# Patient Record
Sex: MALE | Race: WHITE | NOT HISPANIC OR LATINO | Employment: FULL TIME | URBAN - METROPOLITAN AREA
[De-identification: names, ages, dates, MRNs, and addresses within clinical notes are randomized per-mention and may not be internally consistent; named-entity substitution may affect disease eponyms.]

---

## 2019-01-02 ENCOUNTER — HOSPITAL ENCOUNTER (OUTPATIENT)
Dept: FAMILY MEDICINE CLINIC | Facility: CLINIC | Age: 19
Setting detail: SPECIMEN
Discharge: HOME | End: 2019-01-02
Attending: FAMILY MEDICINE | Admitting: FAMILY MEDICINE

## 2019-01-02 LAB
ALBUMIN SERPL-MCNC: 4.2 G/DL (ref 3.5–4.8)
ALBUMIN/GLOB SERPL: 2.1 {RATIO} (ref 1–1.7)
ALP SERPL-CCNC: 48 IU/L (ref 32–91)
ALT SERPL-CCNC: 23 IU/L (ref 17–63)
ANION GAP SERPL CALC-SCNC: 13.1 MMOL/L (ref 10–20)
AST SERPL-CCNC: 25 IU/L (ref 15–41)
BASOPHILS # BLD AUTO: 0 10*3/UL (ref 0–0.2)
BASOPHILS NFR BLD AUTO: 1 % (ref 0–2)
BILIRUB SERPL-MCNC: 1 MG/DL (ref 0.3–1.2)
BUN SERPL-MCNC: 8 MG/DL (ref 8–20)
BUN/CREAT SERPL: 10 (ref 6.2–20.3)
CALCIUM SERPL-MCNC: 9.8 MG/DL (ref 8.9–10.3)
CHLORIDE SERPL-SCNC: 102 MMOL/L (ref 101–111)
CONV CO2: 27 MMOL/L (ref 22–32)
CONV TOTAL PROTEIN: 6.2 G/DL (ref 6.1–7.9)
CREAT UR-MCNC: 0.8 MG/DL (ref 0.7–1.2)
DIFFERENTIAL METHOD BLD: (no result)
EOSINOPHIL # BLD AUTO: 0.3 10*3/UL (ref 0–0.3)
EOSINOPHIL # BLD AUTO: 4 % (ref 0–3)
ERYTHROCYTE [DISTWIDTH] IN BLOOD BY AUTOMATED COUNT: 13.4 % (ref 11.5–14.5)
GLOBULIN UR ELPH-MCNC: 2 G/DL (ref 2.5–3.8)
GLUCOSE SERPL-MCNC: 85 MG/DL (ref 65–99)
HCT VFR BLD AUTO: 46.5 % (ref 40–54)
HGB BLD-MCNC: 15.5 G/DL (ref 14–18)
LYMPHOCYTES # BLD AUTO: 2.3 10*3/UL (ref 0.8–4.8)
LYMPHOCYTES NFR BLD AUTO: 34 % (ref 18–42)
MCH RBC QN AUTO: 30.6 PG (ref 26–32)
MCHC RBC AUTO-ENTMCNC: 33.4 G/DL (ref 32–36)
MCV RBC AUTO: 91.7 FL (ref 80–94)
MONOCYTES # BLD AUTO: 0.7 10*3/UL (ref 0.1–1.3)
MONOCYTES NFR BLD AUTO: 11 % (ref 2–11)
NEUTROPHILS # BLD AUTO: 3.4 10*3/UL (ref 2.3–8.6)
NEUTROPHILS NFR BLD AUTO: 50 % (ref 50–75)
NRBC BLD AUTO-RTO: 0 /100{WBCS}
NRBC/RBC NFR BLD MANUAL: 0 10*3/UL
PLATELET # BLD AUTO: 251 10*3/UL (ref 150–450)
PMV BLD AUTO: 7.9 FL (ref 7.4–10.4)
POTASSIUM SERPL-SCNC: 4.1 MMOL/L (ref 3.6–5.1)
RBC # BLD AUTO: 5.07 10*6/UL (ref 4.6–6)
SODIUM SERPL-SCNC: 138 MMOL/L (ref 136–144)
WBC # BLD AUTO: 6.7 10*3/UL (ref 4.5–11.5)

## 2021-04-01 ENCOUNTER — TELEPHONE (OUTPATIENT)
Dept: FAMILY MEDICINE CLINIC | Facility: CLINIC | Age: 21
End: 2021-04-01

## 2021-04-01 NOTE — TELEPHONE ENCOUNTER
Caller: HernandezTrey stacy    Relationship to patient: Self    Best call back number: [phone]    Chief complaint: STD SCREENING     Type of visit: STD SCREENING     Requested date: ANYDAY BEFORE 4/11/2021         Additional notes:    MR. HERNANDEZ IS NEEDING APPOINTMENT, I AM UNABLE TO SCHEDULE.  UNABLE TO FINISH REGISTRATION WITH PATIENT, DISCONNECTED CALL

## 2021-04-06 ENCOUNTER — LAB (OUTPATIENT)
Dept: FAMILY MEDICINE CLINIC | Facility: CLINIC | Age: 21
End: 2021-04-06

## 2021-04-06 ENCOUNTER — OFFICE VISIT (OUTPATIENT)
Dept: FAMILY MEDICINE CLINIC | Facility: CLINIC | Age: 21
End: 2021-04-06

## 2021-04-06 VITALS
WEIGHT: 149 LBS | SYSTOLIC BLOOD PRESSURE: 112 MMHG | TEMPERATURE: 97.1 F | BODY MASS INDEX: 21.33 KG/M2 | OXYGEN SATURATION: 96 % | RESPIRATION RATE: 16 BRPM | DIASTOLIC BLOOD PRESSURE: 60 MMHG | HEIGHT: 70 IN | HEART RATE: 69 BPM

## 2021-04-06 DIAGNOSIS — Z11.3 SCREENING EXAMINATION FOR STD (SEXUALLY TRANSMITTED DISEASE): Primary | ICD-10-CM

## 2021-04-06 DIAGNOSIS — F39 MOOD DISORDER (HCC): ICD-10-CM

## 2021-04-06 PROBLEM — F41.1 GENERALIZED ANXIETY DISORDER: Status: ACTIVE | Noted: 2019-01-23

## 2021-04-06 PROBLEM — F41.9 ANXIETY: Status: ACTIVE | Noted: 2019-01-02

## 2021-04-06 LAB
HCV AB SER DONR QL: NORMAL
HIV1+2 AB SER QL: NORMAL

## 2021-04-06 PROCEDURE — 36415 COLL VENOUS BLD VENIPUNCTURE: CPT | Performed by: FAMILY MEDICINE

## 2021-04-06 PROCEDURE — 86803 HEPATITIS C AB TEST: CPT | Performed by: FAMILY MEDICINE

## 2021-04-06 PROCEDURE — 86696 HERPES SIMPLEX TYPE 2 TEST: CPT | Performed by: FAMILY MEDICINE

## 2021-04-06 PROCEDURE — G0432 EIA HIV-1/HIV-2 SCREEN: HCPCS | Performed by: FAMILY MEDICINE

## 2021-04-06 PROCEDURE — 86592 SYPHILIS TEST NON-TREP QUAL: CPT | Performed by: FAMILY MEDICINE

## 2021-04-06 PROCEDURE — 86695 HERPES SIMPLEX TYPE 1 TEST: CPT | Performed by: FAMILY MEDICINE

## 2021-04-06 PROCEDURE — 87591 N.GONORRHOEAE DNA AMP PROB: CPT | Performed by: FAMILY MEDICINE

## 2021-04-06 PROCEDURE — 87491 CHLMYD TRACH DNA AMP PROBE: CPT | Performed by: FAMILY MEDICINE

## 2021-04-06 PROCEDURE — 87661 TRICHOMONAS VAGINALIS AMPLIF: CPT | Performed by: FAMILY MEDICINE

## 2021-04-06 PROCEDURE — 99213 OFFICE O/P EST LOW 20 MIN: CPT | Performed by: FAMILY MEDICINE

## 2021-04-06 NOTE — PROGRESS NOTES
Chief Complaint   Patient presents with   • STD testing     HPI  Trey Ndiaye is a 21 y.o. male that presents for   Chief Complaint   Patient presents with   • STD testing     STD concern: patient reports concern for herpes. He states that his recent ex-girlfriend recently told him that she has both HSV1 and HSV2. Patient reports that he was last sexually active w/ this person 1 week ago. He reports 3 partners in the last year. Patient reports not always wearing condoms but usually. He states that they never had sex when she had an outbreak. Patient denies rash, lesions, discharge, etc..    Review of Systems   Constitutional: Negative for chills, fever and unexpected weight loss.   Eyes: Negative for visual disturbance.   Genitourinary: Negative for dysuria, penile swelling, scrotal swelling and testicular pain.   Skin: Negative for rash and skin lesions.     The following portions of the patient's history were reviewed and updated as appropriate: problem list, past medical history, past surgical history, allergies, current medication    Problem List Tab  Patient History Tab  Immunizations Tab  Medications Tab  Chart Review Tab  Care Everywhere Tab  Synopsis Tab    PE  Vitals:    04/06/21 1139   BP: 112/60   Pulse: 69   Resp: 16   Temp: 97.1 °F (36.2 °C)   SpO2: 96%     Body mass index is 21.38 kg/m².  General: Well nourished, NAD  Head: AT/NC  Eyes: EOMI, anicteric sclera  Resp: CTAB, SCR, BS equal  CV: RRR w/o m/r/g; 2+ pulses  GI: Soft, NT/ND, +BS  MSK: FROM, no deformity, no edema  Skin: Warm, dry, intact  Neuro: Alert and oriented. No focal deficits  Psych: Appropriate mood and affect    Imaging  No Images in the past 120 days found..    Assessment/Plan   Trey Ndiaye is a 21 y.o. male that presents for   Chief Complaint   Patient presents with   • STD testing     Diagnoses and all orders for this visit:    1. Screening examination for STD (sexually transmitted disease) (Primary): Unlikely HSV infection given  no history of cold sores or genital lesions/vesicles.  Nonetheless, patient is high risk for STI and will perform screening today  -     Hepatitis C Antibody  -     HSV 1 and 2 IgM, Abs, Indirect  -     HSV 1 and 2-Specific Ab, IgG  -     HIV-1/O/2 ANTIGEN/ANTIBODY, 4TH GENERATION  -     RPR  -     Chlamydia trachomatis, Neisseria gonorrhoeae, Trichomonas vaginalis, PCR - Urine, Urine, Clean Catch  - Counseled regarding safe sex       Return if symptoms worsen or fail to improve.

## 2021-04-07 LAB
HSV1 IGG SER IA-ACNC: <0.91 INDEX (ref 0–0.9)
HSV2 IGG SER IA-ACNC: <0.91 INDEX (ref 0–0.9)
RPR SER QL: NORMAL

## 2021-04-08 LAB
C TRACH RRNA SPEC QL NAA+PROBE: NEGATIVE
HSV1 IGM TITR SER IF: NORMAL TITER
HSV2 IGM TITR SER IF: NORMAL TITER
N GONORRHOEA RRNA SPEC QL NAA+PROBE: NEGATIVE
T VAGINALIS DNA SPEC QL NAA+PROBE: NEGATIVE

## 2021-04-21 ENCOUNTER — HOSPITAL ENCOUNTER (EMERGENCY)
Facility: HOSPITAL | Age: 21
Discharge: HOME | End: 2021-04-21
Attending: EMERGENCY MEDICINE | Admitting: EMERGENCY MEDICINE

## 2021-04-21 VITALS
BODY MASS INDEX: 20.23 KG/M2 | TEMPERATURE: 98.4 F | HEART RATE: 87 BPM | OXYGEN SATURATION: 97 % | DIASTOLIC BLOOD PRESSURE: 80 MMHG | SYSTOLIC BLOOD PRESSURE: 147 MMHG | RESPIRATION RATE: 14 BRPM | HEIGHT: 70 IN | WEIGHT: 141.31 LBS

## 2021-04-21 DIAGNOSIS — F19.10 SYNTHETIC CANNABINOID ABUSE (HCC): ICD-10-CM

## 2021-04-21 DIAGNOSIS — R11.15 PERSISTENT VOMITING: Primary | ICD-10-CM

## 2021-04-21 LAB
ANION GAP SERPL CALCULATED.3IONS-SCNC: 18 MMOL/L (ref 5–15)
BASOPHILS # BLD AUTO: 0 10*3/MM3 (ref 0–0.2)
BASOPHILS NFR BLD AUTO: 0.1 % (ref 0–1.5)
BUN SERPL-MCNC: 15 MG/DL (ref 6–20)
BUN/CREAT SERPL: 14.3 (ref 7–25)
CALCIUM SPEC-SCNC: 11.5 MG/DL (ref 8.6–10.5)
CHLORIDE SERPL-SCNC: 100 MMOL/L (ref 98–107)
CO2 SERPL-SCNC: 22 MMOL/L (ref 22–29)
CREAT SERPL-MCNC: 1.05 MG/DL (ref 0.76–1.27)
DEPRECATED RDW RBC AUTO: 41.1 FL (ref 37–54)
EOSINOPHIL # BLD AUTO: 0 10*3/MM3 (ref 0–0.4)
EOSINOPHIL NFR BLD AUTO: 0 % (ref 0.3–6.2)
ERYTHROCYTE [DISTWIDTH] IN BLOOD BY AUTOMATED COUNT: 12.9 % (ref 12.3–15.4)
GFR SERPL CREATININE-BSD FRML MDRD: 88 ML/MIN/1.73
GLUCOSE SERPL-MCNC: 181 MG/DL (ref 65–99)
HCT VFR BLD AUTO: 51.3 % (ref 37.5–51)
HGB BLD-MCNC: 17.4 G/DL (ref 13–17.7)
LYMPHOCYTES # BLD AUTO: 0.6 10*3/MM3 (ref 0.7–3.1)
LYMPHOCYTES NFR BLD AUTO: 2.4 % (ref 19.6–45.3)
MCH RBC QN AUTO: 31.1 PG (ref 26.6–33)
MCHC RBC AUTO-ENTMCNC: 34 G/DL (ref 31.5–35.7)
MCV RBC AUTO: 91.5 FL (ref 79–97)
MONOCYTES # BLD AUTO: 0.8 10*3/MM3 (ref 0.1–0.9)
MONOCYTES NFR BLD AUTO: 3.3 % (ref 5–12)
NEUTROPHILS NFR BLD AUTO: 22.6 10*3/MM3 (ref 1.7–7)
NEUTROPHILS NFR BLD AUTO: 94.2 % (ref 42.7–76)
NRBC BLD AUTO-RTO: 0.1 /100 WBC (ref 0–0.2)
PLATELET # BLD AUTO: 267 10*3/MM3 (ref 140–450)
PMV BLD AUTO: 7.9 FL (ref 6–12)
POTASSIUM SERPL-SCNC: 4.8 MMOL/L (ref 3.5–5.2)
RBC # BLD AUTO: 5.6 10*6/MM3 (ref 4.14–5.8)
SODIUM SERPL-SCNC: 140 MMOL/L (ref 136–145)
WBC # BLD AUTO: 24 10*3/MM3 (ref 3.4–10.8)

## 2021-04-21 PROCEDURE — 25010000002 PROCHLORPERAZINE 10 MG/2ML SOLUTION: Performed by: EMERGENCY MEDICINE

## 2021-04-21 PROCEDURE — 96374 THER/PROPH/DIAG INJ IV PUSH: CPT

## 2021-04-21 PROCEDURE — 99283 EMERGENCY DEPT VISIT LOW MDM: CPT

## 2021-04-21 PROCEDURE — 80048 BASIC METABOLIC PNL TOTAL CA: CPT | Performed by: EMERGENCY MEDICINE

## 2021-04-21 PROCEDURE — 25010000002 KETOROLAC TROMETHAMINE PER 15 MG: Performed by: EMERGENCY MEDICINE

## 2021-04-21 PROCEDURE — 96375 TX/PRO/DX INJ NEW DRUG ADDON: CPT

## 2021-04-21 PROCEDURE — 85025 COMPLETE CBC W/AUTO DIFF WBC: CPT | Performed by: EMERGENCY MEDICINE

## 2021-04-21 RX ORDER — ONDANSETRON 4 MG/1
4 TABLET, ORALLY DISINTEGRATING ORAL EVERY 8 HOURS PRN
Qty: 12 TABLET | Refills: 0 | Status: SHIPPED | OUTPATIENT
Start: 2021-04-21 | End: 2021-05-07

## 2021-04-21 RX ORDER — SODIUM CHLORIDE 0.9 % (FLUSH) 0.9 %
10 SYRINGE (ML) INJECTION AS NEEDED
Status: DISCONTINUED | OUTPATIENT
Start: 2021-04-21 | End: 2021-04-21 | Stop reason: HOSPADM

## 2021-04-21 RX ORDER — PROCHLORPERAZINE EDISYLATE 5 MG/ML
10 INJECTION INTRAMUSCULAR; INTRAVENOUS ONCE
Status: COMPLETED | OUTPATIENT
Start: 2021-04-21 | End: 2021-04-21

## 2021-04-21 RX ORDER — KETOROLAC TROMETHAMINE 15 MG/ML
15 INJECTION, SOLUTION INTRAMUSCULAR; INTRAVENOUS ONCE
Status: COMPLETED | OUTPATIENT
Start: 2021-04-21 | End: 2021-04-21

## 2021-04-21 RX ADMIN — KETOROLAC TROMETHAMINE 15 MG: 15 INJECTION, SOLUTION INTRAMUSCULAR; INTRAVENOUS at 17:16

## 2021-04-21 RX ADMIN — SODIUM CHLORIDE 500 ML: 9 INJECTION, SOLUTION INTRAVENOUS at 16:14

## 2021-04-21 RX ADMIN — PROCHLORPERAZINE EDISYLATE 10 MG: 5 INJECTION INTRAMUSCULAR; INTRAVENOUS at 16:14

## 2021-05-07 ENCOUNTER — OFFICE VISIT (OUTPATIENT)
Dept: FAMILY MEDICINE CLINIC | Facility: CLINIC | Age: 21
End: 2021-05-07

## 2021-05-07 VITALS
OXYGEN SATURATION: 98 % | WEIGHT: 153.4 LBS | BODY MASS INDEX: 21.96 KG/M2 | RESPIRATION RATE: 12 BRPM | SYSTOLIC BLOOD PRESSURE: 107 MMHG | HEIGHT: 70 IN | TEMPERATURE: 96.8 F | HEART RATE: 80 BPM | DIASTOLIC BLOOD PRESSURE: 66 MMHG

## 2021-05-07 DIAGNOSIS — F41.9 ANXIETY: Primary | ICD-10-CM

## 2021-05-07 DIAGNOSIS — Z72.0 TOBACCO USE: ICD-10-CM

## 2021-05-07 PROCEDURE — 99213 OFFICE O/P EST LOW 20 MIN: CPT | Performed by: NURSE PRACTITIONER

## 2021-05-07 RX ORDER — BUPROPION HYDROCHLORIDE 150 MG/1
150 TABLET ORAL DAILY
Qty: 30 TABLET | Refills: 1 | Status: SHIPPED | OUTPATIENT
Start: 2021-05-07 | End: 2021-06-22

## 2021-05-07 NOTE — PROGRESS NOTES
"Chief Complaint  Anxiety and Depression    Subjective          Trey Ndiaye presents to [facility]  History of Present Illness   Is having anxiety, depression, issues with anger  Is scheduled to speak with an on-line counselor tomorrow, this is a service which is covered by his health insurance  Has taken mothers buspar prescription which he does not feel was helpful  Continues to use marijuana regularly, \"it's like cigarettes\"   Does also smoke cigarettes regularly    These problems have been ongoing for him since about age 12    Tells me that he feels constantly panicky  Also has some intermittent depression, feeling down  Works 4, 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 - tells me that the anxiety is better at work    Wants to quit smoking, has cut back from 2 ppd to 1 or less ppd  There is smoking in the house, step-dad  Encouraged cessation, discussed ways to work towards that goal including medicine    For depression/anxiety, he tells me that he has tried zoloft in past, depakote, cymbalta, lexapro - thinks that none of these have helped with his mood  Would like to try a different medicine for his mood  Denies any thoughts of suicide or self-harm    Objective   Vital Signs:   /66   Pulse 80   Temp 96.8 °F (36 °C)   Resp 12   Ht 177.8 cm (70\")   Wt 69.6 kg (153 lb 6.4 oz)   SpO2 98%   BMI 22.01 kg/m²     Physical Exam  Constitutional:       Appearance: Normal appearance. He is well-developed and well-groomed.   HENT:      Head: Normocephalic.   Cardiovascular:      Rate and Rhythm: Normal rate.      Heart sounds: Normal heart sounds.   Pulmonary:      Effort: Pulmonary effort is normal.      Breath sounds: Normal breath sounds.   Musculoskeletal:         General: Normal range of motion.   Skin:     General: Skin is warm.   Neurological:      Mental Status: He is alert.   Psychiatric:         Mood and Affect: Mood normal.        Result Review :                 Assessment and " Plan    Diagnoses and all orders for this visit:    1. Tobacco use (Primary)  -     buPROPion XL (Wellbutrin XL) 150 MG 24 hr tablet; Take 1 tablet by mouth Daily for 30 days.  Dispense: 30 tablet; Refill: 1    2. Anxiety  -     buPROPion XL (Wellbutrin XL) 150 MG 24 hr tablet; Take 1 tablet by mouth Daily for 30 days.  Dispense: 30 tablet; Refill: 1    return in 4 weeks for follow up      Follow Up   Return in about 4 weeks (around 6/4/2021), or if symptoms worsen or fail to improve.  Patient was given instructions and counseling regarding his condition or for health maintenance advice. Please see specific information pulled into the AVS if appropriate.

## 2021-06-21 ENCOUNTER — HOSPITAL ENCOUNTER (EMERGENCY)
Facility: HOSPITAL | Age: 21
Discharge: HOME | End: 2021-06-21

## 2021-06-21 PROCEDURE — 99211 OFF/OP EST MAY X REQ PHY/QHP: CPT

## 2021-06-22 ENCOUNTER — APPOINTMENT (OUTPATIENT)
Dept: GENERAL RADIOLOGY | Facility: HOSPITAL | Age: 21
End: 2021-06-22

## 2021-06-22 ENCOUNTER — HOSPITAL ENCOUNTER (OUTPATIENT)
Facility: HOSPITAL | Age: 21
Setting detail: OBSERVATION
Discharge: LEFT BEFORE DISCHARGE | End: 2021-06-24
Attending: FAMILY MEDICINE | Admitting: FAMILY MEDICINE

## 2021-06-22 ENCOUNTER — APPOINTMENT (OUTPATIENT)
Dept: ULTRASOUND IMAGING | Facility: HOSPITAL | Age: 21
End: 2021-06-22

## 2021-06-22 DIAGNOSIS — E86.0 DEHYDRATION: Primary | ICD-10-CM

## 2021-06-22 DIAGNOSIS — M62.82 NON-TRAUMATIC RHABDOMYOLYSIS: ICD-10-CM

## 2021-06-22 DIAGNOSIS — R11.2 NAUSEA AND VOMITING, INTRACTABILITY OF VOMITING NOT SPECIFIED, UNSPECIFIED VOMITING TYPE: ICD-10-CM

## 2021-06-22 DIAGNOSIS — N17.9 ACUTE RENAL FAILURE, UNSPECIFIED ACUTE RENAL FAILURE TYPE (HCC): ICD-10-CM

## 2021-06-22 DIAGNOSIS — R11.2 CANNABINOID HYPEREMESIS SYNDROME: ICD-10-CM

## 2021-06-22 DIAGNOSIS — D72.829 LEUKOCYTOSIS, UNSPECIFIED TYPE: ICD-10-CM

## 2021-06-22 DIAGNOSIS — F12.90 CANNABINOID HYPEREMESIS SYNDROME: ICD-10-CM

## 2021-06-22 PROBLEM — J98.2 PNEUMOMEDIASTINUM: Status: ACTIVE | Noted: 2021-06-22

## 2021-06-22 PROBLEM — E87.8 ELECTROLYTE IMBALANCE: Status: ACTIVE | Noted: 2021-06-22

## 2021-06-22 PROBLEM — J93.9 PNEUMOTHORAX, LEFT: Status: ACTIVE | Noted: 2021-06-22

## 2021-06-22 PROBLEM — E87.20 LACTIC ACIDOSIS: Status: ACTIVE | Noted: 2021-06-22

## 2021-06-22 LAB
ALBUMIN SERPL-MCNC: 5.7 G/DL (ref 3.5–5.2)
ALBUMIN/GLOB SERPL: 1.6 G/DL
ALP SERPL-CCNC: 69 U/L (ref 39–117)
ALT SERPL W P-5'-P-CCNC: 23 U/L (ref 1–41)
AMPHET+METHAMPHET UR QL: NEGATIVE
ANION GAP SERPL CALCULATED.3IONS-SCNC: 32 MMOL/L (ref 5–15)
AST SERPL-CCNC: 51 U/L (ref 1–40)
BACTERIA UR QL AUTO: ABNORMAL /HPF
BARBITURATES UR QL SCN: NEGATIVE
BASOPHILS # BLD AUTO: 0.1 10*3/MM3 (ref 0–0.2)
BASOPHILS NFR BLD AUTO: 0.3 % (ref 0–1.5)
BENZODIAZ UR QL SCN: POSITIVE
BILIRUB SERPL-MCNC: 1.4 MG/DL (ref 0–1.2)
BILIRUB UR QL STRIP: NEGATIVE
BUN SERPL-MCNC: 75 MG/DL (ref 6–20)
BUN/CREAT SERPL: 11.6 (ref 7–25)
CALCIUM SPEC-SCNC: 11.3 MG/DL (ref 8.6–10.5)
CANNABINOIDS SERPL QL: POSITIVE
CHLORIDE SERPL-SCNC: 70 MMOL/L (ref 98–107)
CK SERPL-CCNC: 1470 U/L (ref 20–200)
CLARITY UR: ABNORMAL
CO2 SERPL-SCNC: 25 MMOL/L (ref 22–29)
COARSE GRAN CASTS URNS QL MICRO: ABNORMAL /LPF
COCAINE UR QL: NEGATIVE
COLOR UR: YELLOW
CREAT SERPL-MCNC: 6.49 MG/DL (ref 0.76–1.27)
D-LACTATE SERPL-SCNC: 2 MMOL/L (ref 0.5–2)
D-LACTATE SERPL-SCNC: 5.6 MMOL/L (ref 0.5–2)
DEPRECATED RDW RBC AUTO: 39.8 FL (ref 37–54)
EOSINOPHIL # BLD AUTO: 0.1 10*3/MM3 (ref 0–0.4)
EOSINOPHIL NFR BLD AUTO: 0.3 % (ref 0.3–6.2)
ERYTHROCYTE [DISTWIDTH] IN BLOOD BY AUTOMATED COUNT: 13.3 % (ref 12.3–15.4)
GFR SERPL CREATININE-BSD FRML MDRD: 11 ML/MIN/1.73
GFR SERPL CREATININE-BSD FRML MDRD: ABNORMAL ML/MIN/{1.73_M2}
GLOBULIN UR ELPH-MCNC: 3.6 GM/DL
GLUCOSE SERPL-MCNC: 144 MG/DL (ref 65–99)
GLUCOSE UR STRIP-MCNC: ABNORMAL MG/DL
HAV IGM SERPL QL IA: NORMAL
HBV CORE IGM SERPL QL IA: NORMAL
HBV SURFACE AG SERPL QL IA: NORMAL
HCT VFR BLD AUTO: 55.6 % (ref 37.5–51)
HCV AB SER DONR QL: NORMAL
HGB BLD-MCNC: 19.7 G/DL (ref 13–17.7)
HGB UR QL STRIP.AUTO: ABNORMAL
HYALINE CASTS UR QL AUTO: ABNORMAL /LPF
KETONES UR QL STRIP: NEGATIVE
LEUKOCYTE ESTERASE UR QL STRIP.AUTO: NEGATIVE
LIPASE SERPL-CCNC: 25 U/L (ref 13–60)
LYMPHOCYTES # BLD AUTO: 1.7 10*3/MM3 (ref 0.7–3.1)
LYMPHOCYTES NFR BLD AUTO: 6.7 % (ref 19.6–45.3)
MAGNESIUM SERPL-MCNC: 3.1 MG/DL (ref 1.6–2.6)
MCH RBC QN AUTO: 30.3 PG (ref 26.6–33)
MCHC RBC AUTO-ENTMCNC: 35.4 G/DL (ref 31.5–35.7)
MCV RBC AUTO: 85.6 FL (ref 79–97)
METHADONE UR QL SCN: NEGATIVE
MONOCYTES # BLD AUTO: 2.1 10*3/MM3 (ref 0.1–0.9)
MONOCYTES NFR BLD AUTO: 8.3 % (ref 5–12)
NEUTROPHILS NFR BLD AUTO: 21.4 10*3/MM3 (ref 1.7–7)
NEUTROPHILS NFR BLD AUTO: 84.4 % (ref 42.7–76)
NITRITE UR QL STRIP: NEGATIVE
NRBC BLD AUTO-RTO: 0.2 /100 WBC (ref 0–0.2)
OPIATES UR QL: NEGATIVE
OXYCODONE UR QL SCN: NEGATIVE
PH UR STRIP.AUTO: <=5 [PH] (ref 5–8)
PLATELET # BLD AUTO: 329 10*3/MM3 (ref 140–450)
PMV BLD AUTO: 8.1 FL (ref 6–12)
POTASSIUM SERPL-SCNC: 3.4 MMOL/L (ref 3.5–5.2)
PROCALCITONIN SERPL-MCNC: 2.05 NG/ML (ref 0–0.25)
PROT SERPL-MCNC: 9.3 G/DL (ref 6–8.5)
PROT UR QL STRIP: ABNORMAL
QT INTERVAL: 364 MS
RBC # BLD AUTO: 6.5 10*6/MM3 (ref 4.14–5.8)
RBC # UR: ABNORMAL /HPF
REF LAB TEST METHOD: ABNORMAL
SARS-COV-2 ORF1AB RESP QL NAA+PROBE: NOT DETECTED
SODIUM SERPL-SCNC: 127 MMOL/L (ref 136–145)
SP GR UR STRIP: 1.02 (ref 1–1.03)
SQUAMOUS #/AREA URNS HPF: ABNORMAL /HPF
UROBILINOGEN UR QL STRIP: ABNORMAL
WBC # BLD AUTO: 25.4 10*3/MM3 (ref 3.4–10.8)
WBC UR QL AUTO: ABNORMAL /HPF

## 2021-06-22 PROCEDURE — 80307 DRUG TEST PRSMV CHEM ANLYZR: CPT | Performed by: NURSE PRACTITIONER

## 2021-06-22 PROCEDURE — 87040 BLOOD CULTURE FOR BACTERIA: CPT | Performed by: NURSE PRACTITIONER

## 2021-06-22 PROCEDURE — 96361 HYDRATE IV INFUSION ADD-ON: CPT

## 2021-06-22 PROCEDURE — 96375 TX/PRO/DX INJ NEW DRUG ADDON: CPT

## 2021-06-22 PROCEDURE — 25010000002 PROCHLORPERAZINE 10 MG/2ML SOLUTION: Performed by: NURSE PRACTITIONER

## 2021-06-22 PROCEDURE — 80074 ACUTE HEPATITIS PANEL: CPT | Performed by: NURSE PRACTITIONER

## 2021-06-22 PROCEDURE — 99284 EMERGENCY DEPT VISIT MOD MDM: CPT

## 2021-06-22 PROCEDURE — G0378 HOSPITAL OBSERVATION PER HR: HCPCS

## 2021-06-22 PROCEDURE — 84145 PROCALCITONIN (PCT): CPT | Performed by: NURSE PRACTITIONER

## 2021-06-22 PROCEDURE — U0004 COV-19 TEST NON-CDC HGH THRU: HCPCS | Performed by: FAMILY MEDICINE

## 2021-06-22 PROCEDURE — 25010000002 ONDANSETRON PER 1 MG: Performed by: NURSE PRACTITIONER

## 2021-06-22 PROCEDURE — 51798 US URINE CAPACITY MEASURE: CPT

## 2021-06-22 PROCEDURE — 83735 ASSAY OF MAGNESIUM: CPT | Performed by: NURSE PRACTITIONER

## 2021-06-22 PROCEDURE — 85025 COMPLETE CBC W/AUTO DIFF WBC: CPT | Performed by: NURSE PRACTITIONER

## 2021-06-22 PROCEDURE — 82550 ASSAY OF CK (CPK): CPT | Performed by: NURSE PRACTITIONER

## 2021-06-22 PROCEDURE — 71045 X-RAY EXAM CHEST 1 VIEW: CPT

## 2021-06-22 PROCEDURE — 99220 PR INITIAL OBSERVATION CARE/DAY 70 MINUTES: CPT | Performed by: FAMILY MEDICINE

## 2021-06-22 PROCEDURE — 93005 ELECTROCARDIOGRAM TRACING: CPT | Performed by: FAMILY MEDICINE

## 2021-06-22 PROCEDURE — 81001 URINALYSIS AUTO W/SCOPE: CPT | Performed by: NURSE PRACTITIONER

## 2021-06-22 PROCEDURE — 96374 THER/PROPH/DIAG INJ IV PUSH: CPT

## 2021-06-22 PROCEDURE — 83690 ASSAY OF LIPASE: CPT | Performed by: NURSE PRACTITIONER

## 2021-06-22 PROCEDURE — 25010000002 CEFTRIAXONE PER 250 MG: Performed by: NURSE PRACTITIONER

## 2021-06-22 PROCEDURE — 36415 COLL VENOUS BLD VENIPUNCTURE: CPT

## 2021-06-22 PROCEDURE — 76775 US EXAM ABDO BACK WALL LIM: CPT

## 2021-06-22 PROCEDURE — 80053 COMPREHEN METABOLIC PANEL: CPT | Performed by: NURSE PRACTITIONER

## 2021-06-22 PROCEDURE — 83605 ASSAY OF LACTIC ACID: CPT

## 2021-06-22 RX ORDER — ACETAMINOPHEN 325 MG/1
650 TABLET ORAL EVERY 4 HOURS PRN
Status: DISCONTINUED | OUTPATIENT
Start: 2021-06-22 | End: 2021-06-23

## 2021-06-22 RX ORDER — SODIUM CHLORIDE 0.9 % (FLUSH) 0.9 %
10 SYRINGE (ML) INJECTION AS NEEDED
Status: DISCONTINUED | OUTPATIENT
Start: 2021-06-22 | End: 2021-06-24 | Stop reason: HOSPADM

## 2021-06-22 RX ORDER — CHOLECALCIFEROL (VITAMIN D3) 125 MCG
5 CAPSULE ORAL NIGHTLY PRN
Status: DISCONTINUED | OUTPATIENT
Start: 2021-06-22 | End: 2021-06-24 | Stop reason: HOSPADM

## 2021-06-22 RX ORDER — ONDANSETRON 4 MG/1
4 TABLET, FILM COATED ORAL EVERY 6 HOURS PRN
Status: DISCONTINUED | OUTPATIENT
Start: 2021-06-22 | End: 2021-06-24 | Stop reason: HOSPADM

## 2021-06-22 RX ORDER — ACETAMINOPHEN 650 MG/1
650 SUPPOSITORY RECTAL EVERY 4 HOURS PRN
Status: DISCONTINUED | OUTPATIENT
Start: 2021-06-22 | End: 2021-06-23

## 2021-06-22 RX ORDER — SODIUM CHLORIDE 0.9 % (FLUSH) 0.9 %
10 SYRINGE (ML) INJECTION EVERY 12 HOURS SCHEDULED
Status: DISCONTINUED | OUTPATIENT
Start: 2021-06-22 | End: 2021-06-24 | Stop reason: HOSPADM

## 2021-06-22 RX ORDER — ONDANSETRON 2 MG/ML
4 INJECTION INTRAMUSCULAR; INTRAVENOUS EVERY 6 HOURS PRN
Status: DISCONTINUED | OUTPATIENT
Start: 2021-06-22 | End: 2021-06-24 | Stop reason: HOSPADM

## 2021-06-22 RX ORDER — ACETAMINOPHEN 160 MG/5ML
650 SOLUTION ORAL EVERY 4 HOURS PRN
Status: DISCONTINUED | OUTPATIENT
Start: 2021-06-22 | End: 2021-06-23

## 2021-06-22 RX ORDER — ONDANSETRON 2 MG/ML
4 INJECTION INTRAMUSCULAR; INTRAVENOUS ONCE
Status: COMPLETED | OUTPATIENT
Start: 2021-06-22 | End: 2021-06-22

## 2021-06-22 RX ORDER — SODIUM CHLORIDE 9 MG/ML
200 INJECTION, SOLUTION INTRAVENOUS CONTINUOUS
Status: DISCONTINUED | OUTPATIENT
Start: 2021-06-22 | End: 2021-06-23

## 2021-06-22 RX ORDER — PROCHLORPERAZINE EDISYLATE 5 MG/ML
5 INJECTION INTRAMUSCULAR; INTRAVENOUS EVERY 6 HOURS PRN
Status: DISCONTINUED | OUTPATIENT
Start: 2021-06-22 | End: 2021-06-24 | Stop reason: HOSPADM

## 2021-06-22 RX ORDER — POTASSIUM CHLORIDE 20 MEQ/1
20 TABLET, EXTENDED RELEASE ORAL ONCE
Status: COMPLETED | OUTPATIENT
Start: 2021-06-22 | End: 2021-06-22

## 2021-06-22 RX ORDER — ALUMINA, MAGNESIA, AND SIMETHICONE 2400; 2400; 240 MG/30ML; MG/30ML; MG/30ML
15 SUSPENSION ORAL EVERY 6 HOURS PRN
Status: DISCONTINUED | OUTPATIENT
Start: 2021-06-22 | End: 2021-06-24 | Stop reason: HOSPADM

## 2021-06-22 RX ADMIN — SODIUM CHLORIDE 1000 ML: 9 INJECTION, SOLUTION INTRAVENOUS at 12:33

## 2021-06-22 RX ADMIN — ACETAMINOPHEN 650 MG: 325 TABLET, FILM COATED ORAL at 18:59

## 2021-06-22 RX ADMIN — SODIUM CHLORIDE, POTASSIUM CHLORIDE, SODIUM LACTATE AND CALCIUM CHLORIDE 1000 ML: 600; 310; 30; 20 INJECTION, SOLUTION INTRAVENOUS at 11:34

## 2021-06-22 RX ADMIN — SODIUM CHLORIDE 200 ML/HR: 9 INJECTION, SOLUTION INTRAVENOUS at 13:52

## 2021-06-22 RX ADMIN — PROCHLORPERAZINE EDISYLATE 5 MG: 5 INJECTION INTRAMUSCULAR; INTRAVENOUS at 19:00

## 2021-06-22 RX ADMIN — WATER 1 G: 1000 INJECTION, SOLUTION INTRAVENOUS at 13:53

## 2021-06-22 RX ADMIN — Medication 10 ML: at 20:32

## 2021-06-22 RX ADMIN — ONDANSETRON 4 MG: 2 INJECTION INTRAMUSCULAR; INTRAVENOUS at 11:38

## 2021-06-22 RX ADMIN — POTASSIUM CHLORIDE 20 MEQ: 1500 TABLET, EXTENDED RELEASE ORAL at 13:52

## 2021-06-22 RX ADMIN — Medication 10 ML: at 22:26

## 2021-06-22 NOTE — H&P
[facility]      Patient Name: Trey Ndiaye  : 2000  MRN: [id number]  Primary Care Physician: Radha Castellanos APRN  Date of admission: 2021    Patient Care Team:  Radha Castellanos APRN as PCP - General (Family Medicine)          Subjective   History Present Illness     Chief Complaint:   Chief Complaint   Patient presents with   • Abdominal Pain       Mr. Ndiaye is a 21 y.o. male with PMH of cannabinoid hyperemesis syndrome presented to [facility] ED 2021 with intractable nausea and vomiting for the last 4-5 days.  He reports generalized pain/body aches and generalized abdominal pain.  He denied any diarrhea.  He denied any fever.  He is unable to keep anything down orally.  He has lost weight. He has not been able to urinate. He was evaluated at [facility] on 2021 and left the hospital AGAINST MEDICAL ADVICE.  He smokes marijuana 2-4 times a day with last use 4 to 5 days ago.    In the ED the patient was found to have BUN 75 and creatinine 6.49.  Severe electrolyte abnormalities with sodium 127, potassium 3.4, chloride 70, calcium 11.3.  CK total 1470.  WBC 25.4, lactate 5.6.  Chest x-ray showed tiny left apical pneumothorax, pneumomediastinum and subcutaneous emphysema.  He was afebrile, not tachycardic, normotensive.  He was given 2 L IV fluid boluses, IV Zofran, and IV Rocephin.  Neurology was consulted.  He was admitted to the hospitalist group for further treatment of intractable nausea and vomiting secondary to cannabinoid hyperemesis syndrome resulting in dehydration and acute renal failure.       Review of Systems   Constitutional: Positive for decreased appetite, malaise/fatigue and weight loss.   HENT: Negative.    Eyes: Negative.    Cardiovascular: Negative.    Respiratory: Negative.    Endocrine: Negative.    Hematologic/Lymphatic: Negative.    Skin: Negative.    Musculoskeletal: Negative.    Gastrointestinal:  Positive for abdominal pain, nausea and vomiting.   Genitourinary: Negative.    Neurological: Positive for weakness.   Psychiatric/Behavioral: Negative.    Allergic/Immunologic: Negative.    All other systems reviewed and are negative.      Personal History     Past Medical History:   Past Medical History:   Diagnosis Date   • Anxiety 1/2/2019       Surgical History:    No past surgical history on file.        Family History: family history includes Breast cancer in his paternal grandmother; No Known Problems in his father, maternal grandfather, maternal grandmother, mother, and paternal grandfather. Family History was reviewed.     Social History:  reports that he has been smoking. He has never used smokeless tobacco. He reports current alcohol use. He reports current drug use. Drug: Marijuana.      Medications:  Prior to Admission medications    Medication Sig Start Date End Date Taking? Authorizing Provider   buPROPion XL (Wellbutrin XL) 150 MG 24 hr tablet Take 1 tablet by mouth Daily for 30 days. 5/7/21 6/22/21  Radha Castellanos APRN       Allergies:  No Known Allergies    Objective   Objective     Vital Signs  Temp:  [97.8 °F (36.6 °C)] 97.8 °F (36.6 °C)  Heart Rate:  [] 84  Resp:  [16-20] 17  BP: (118-147)/(78-96) 147/95  SpO2:  [94 %-100 %] 100 %  on   ;   Device (Oxygen Therapy): room air  Body mass index is 15.34 kg/m².    Physical Exam  Vitals and nursing note reviewed.   Constitutional:       Appearance: He is cachectic.   HENT:      Head: Normocephalic.   Eyes:      Extraocular Movements: Extraocular movements intact.      Pupils: Pupils are equal, round, and reactive to light.   Cardiovascular:      Rate and Rhythm: Normal rate and regular rhythm.      Pulses: Normal pulses.      Heart sounds: Normal heart sounds.   Pulmonary:      Effort: Pulmonary effort is normal.      Breath sounds: Examination of the right-lower field reveals decreased breath sounds. Examination of the left-lower field  reveals decreased breath sounds. Decreased breath sounds present.   Abdominal:      General: Abdomen is flat. Bowel sounds are normal.      Tenderness: There is abdominal tenderness.   Musculoskeletal:         General: Normal range of motion.      Cervical back: Normal range of motion.   Skin:     General: Skin is warm and dry.   Neurological:      Mental Status: He is alert and oriented to person, place, and time.   Psychiatric:         Mood and Affect: Mood normal.         Behavior: Behavior normal.         Results Review:  I have personally reviewed most recent cardiac tracings, lab results and radiology images and interpretations and agree with findings  Results from last 7 days   Lab Units 06/22/21  1137   WBC 10*3/mm3 25.40*   HEMOGLOBIN g/dL 19.7*   HEMATOCRIT % 55.6*   PLATELETS 10*3/mm3 329     Results from last 7 days   Lab Units 06/22/21  1324 06/22/21  1137   SODIUM mmol/L  --  127*   POTASSIUM mmol/L  --  3.4*   CHLORIDE mmol/L  --  70*   CO2 mmol/L  --  25.0   BUN mg/dL  --  75*   CREATININE mg/dL  --  6.49*   GLUCOSE mg/dL  --  144*   CALCIUM mg/dL  --  11.3*   ALT (SGPT) U/L  --  23   AST (SGOT) U/L  --  51*   LACTATE mmol/L 5.6*  --      Estimated Creatinine Clearance: 12.5 mL/min (A) (by C-G formula based on SCr of 6.49 mg/dL (H)).  Brief Urine Lab Results     None          Microbiology Results (last 10 days)     ** No results found for the last 240 hours. **          ECG/EMG Results (most recent)     None                  XR Chest 1 View    Result Date: 6/22/2021   1. Tiny left apical pneumothorax. 2. Pneumomediastinum and subcutaneous emphysema.  Findings discussed with the ER provider at the time of this dictation by telephone  Electronically Signed By-Tom Burnett MD On:6/22/2021 1:03 PM This report was finalized on [id number] by  Tom Burnett MD.        Estimated Creatinine Clearance: 12.5 mL/min (A) (by C-G formula based on SCr of 6.49 mg/dL (H)).    Assessment/Plan   Assessment/Plan        Active Hospital Problems    Diagnosis  POA   • **Acute renal failure (ARF) (CMS/HCC) [N17.9]  Yes     Priority: High   • Dehydration [E86.0]  Yes     Priority: High   • Intractable nausea and vomiting [R11.2]  Yes     Priority: High   • Rhabdomyolysis [M62.82]  Yes     Priority: High   • Electrolyte imbalance [E87.8]  Yes     Priority: High   • Pneumothorax, left [J93.9]  Yes     Priority: High   • Pneumomediastinum (CMS/HCC) [J98.2]  Yes     Priority: High   • Lactic acidosis [E87.2]  Yes     Priority: Medium   • Leukocytosis [D72.829]  Yes     Priority: Medium      Resolved Hospital Problems   No resolved problems to display.     Acute renal failure secondary to dehydration  Intractable nausea and vomiting   Rhabdomyolysis  Severe electrolyte abnormalities  Cannabinoid hyperemesis syndrome  -BUN 75, creatinine 6.49  -, K3.4, CL 70, CA 11.3  -CK total 1470  -2L IV fluids given in ED, nephrology started 200 mL/h IV fluids  -Renal ultrasound pending  -Monitor BMP  -antiemetics prn     Lactic acidosis   leukocytosis  -Secondary to above  -Afebrile, normal heart rate and blood pressure  -given empiric IV rocephin in ED  -check procalcitonin     Tiny Left apical pneumothorax  Pneumomediastinum and subcutaneous emphysema  -secondary to intractable nausea and vomiting  -normal oxygen saturation on room air  -monitor oxygen saturation, heart rate, BP  -treat N/V    Marijuana abuse  -Encourage cessation      VTE Prophylaxis -   Mechanical Order History:      Ordered        06/22/21 1343  Place Sequential Compression Device  Once         06/22/21 1343  Maintain Sequential Compression Device  Continuous                 Pharmalogical Order History:     None          CODE STATUS:    Code Status and Medical Interventions:   Ordered at: 06/22/21 1343     Level Of Support Discussed With:    Patient     Code Status:    CPR     Medical Interventions (Level of Support Prior to Arrest):    Full       This patient has been  examined wearing appropriate Personal Protective Equipment 06/22/21      I discussed the patient's findings and my recommendations with patient and family.      Signature: Electronically signed by JAM Goramn, 06/22/21, 2:02 PM EDT.    [facility] Hospitalist Team    Attending attestation:    I performed a history and physical examination of the patient. I reviewed the nurse practitioner's note and agree with the documented findings and plan of care.    S:    Patient is a 21-year-old male with history of cannabis hyperemesis presenting for evaluation of nausea and vomiting.  Patient seen in multiple ERs over the last few days signs of progressive dehydration.  Patient today in renal failure, nephrology consulted.    O:    Vital signs reviewed    General: Male lying in bed breathing comfortably on room air no acute distress  HEENT: NC/AT, EOMI, mucosa dry  Heart: Regular, rate controlled  Chest: Normal work of breathing, moving air well  Abdominal: Soft. NT/ND.   Musculoskeletal: Normal ROM.  No edema. No calf tenderness.  Neurological: AAOx3, no focal deficits  Skin: Skin is warm and dry. No rash  Psychiatric: Normal mood and affect.    A/P    Nausea/vomiting-at least some component of cannabis hyperemesis  -Antiemetics  -IV fluids  -N.p.o. advance diet as tolerated  -Blood cultures ordered    Renal failure-most likely acute in nature given patient's normal renal function the last few days likely prerenal in nature  -IV fluids  -Renal ultrasound  -Nephrology consult  -Electrolytes per nephrology    Hyponatremia-most likely hypovolemic in nature  -Monitor closely with IV hydration    Hypermagnesemia-due to renal dysfunction  -Monitor daily    Rhabdomyolysis-likely due to severe volume depletion  -IV fluids  -Monitor CK  -Monitor urine output    Lactic acidosis-most likely due to volume depletion  -IV fluids  -Can trend for now    Leukocytosis-stress response versus underlying gastritis  -Monitor  daily  -No antibiotics for now    Pneumothorax-small, patient hemodynamically stable with no respiratory distress likely due to vomiting  -Serial chest x-rays  -Monitor O2     Marijuana use-patient with signs of cannabis hyperemesis  -Cessation advised  -Check urine tox    Electronically signed by Bossman Russell MD, 06/22/21, 2:18 PM EDT.

## 2021-06-22 NOTE — ED PROVIDER NOTES
Subjective        Chief complaint: Vomiting      Context: Patient is a 21-year-old male who comes in by private vehicle with friend of the family with complaints of nausea and vomiting for the last week.  He states he has not had a bowel movement in 6 days and has not urinated in 6 days.  He denies any fever cough congestion or upper respiratory symptoms.  He has a history of depression and anxiety.  He states he smokes marijuana 2-4 times per day and denies any alcohol or IVDA.  He states he does have a history of cannabinoid hyperemesis and this feels like that.  He denies any specific abdominal pain just occasional cramping.  He denies any testicular pain.  He denies any recent Covid exposure or antibiotic use.  Did not take Covid vaccine.  Patient was here yesterday and eloped to go to [place].  He reportedly left AMA from [place] yesterday.    Duration: Chronic, worse over the last 6 days    Timing: Waxes and wanes    Severity: Moderate    Associated symptoms: Has not had any medications help alleviate his symptoms          PCP:   ayden          Review of Systems   Constitutional: Negative for fever.   HENT: Negative.    Eyes: Negative for visual disturbance.   Respiratory: Negative.    Cardiovascular: Negative.    Gastrointestinal: Positive for abdominal pain, nausea and vomiting.   Genitourinary: Positive for decreased urine volume.   Musculoskeletal: Negative.    Skin: Negative.    Allergic/Immunologic: Negative for immunocompromised state.   Neurological: Negative.    Hematological: Bruises/bleeds easily.   Psychiatric/Behavioral: Negative for confusion.       Past Medical History:   Diagnosis Date   • Anxiety 1/2/2019       No Known Allergies    No past surgical history on file.    Family History   Problem Relation Age of Onset   • No Known Problems Mother    • No Known Problems Father    • No Known Problems Maternal Grandmother    • No Known Problems Maternal Grandfather    • Breast cancer Paternal  Grandmother    • No Known Problems Paternal Grandfather        Social History     Socioeconomic History   • Marital status: Single     Spouse name: Not on file   • Number of children: Not on file   • Years of education: Not on file   • Highest education level: Not on file   Tobacco Use   • Smoking status: Current Every Day Smoker   • Smokeless tobacco: Never Used   Vaping Use   • Vaping Use: Every day   • Substances: Nicotine, THC, Flavoring   • Devices: Disposable, Pre-filled pod   • Passive vaping exposure Yes   Substance and Sexual Activity   • Alcohol use: Yes     Comment: occ   • Drug use: Yes     Types: Marijuana     Comment: 7 days a week since he was 12.   • Sexual activity: Yes           Objective   Physical Exam     Vital signs and triage nurse note reviewed.   Constitutional: Awake, alert; thin and ill-appearing  HEENT: Normocephalic, atraumatic; pupils are PERRL with intact EOM; oropharynx is pink and moist without exudate or erythema.   Neck: Supple, full range of motion without pain;   Cardiovascular: Regular rate and rhythm, normal S1-S2.   Pulmonary: Respiratory effort regular nonlabored, breath sounds clear to auscultation all fields.   Abdomen: Soft, diffuse mild tenderness without peritoneal rigidity nondistended with normoactive bowel sounds; no rebound or guarding. Negative Garza McBurney  Musculoskeletal: Independent range of motion of all extremities with no palpable tenderness or edema.   Neuro: Alert oriented x3, speech is clear and appropriate, GCS 15   Skin:  Fleshtone cool, dry, intact; no erythematous or petechial rash or lesion       Procedures           ED Course  ED Course as of Jun 22 1328   Tue Jun 22, 2021   1307 Spoke with radiologist regarding chest x-ray who does not feel like CT is warranted at this time based on xr    [JW]   1326 Nursing staff reports patient is screening positive for sepsis based and they placed hospital standing orders.  He is not hypotensive but lactic is  over 5 and he already had the 30ml/kg bolus of fluids. Lactic acidosis could be related to vomiting.  Rocephin given.     [JW]      ED Course User Index  [JW] Michell Martin APRN      Labs Reviewed   COMPREHENSIVE METABOLIC PANEL - Abnormal; Notable for the following components:       Result Value    Glucose 144 (*)     BUN 75 (*)     Creatinine 6.49 (*)     Sodium 127 (*)     Potassium 3.4 (*)     Chloride 70 (*)     Calcium 11.3 (*)     Total Protein 9.3 (*)     Albumin 5.70 (*)     AST (SGOT) 51 (*)     Total Bilirubin 1.4 (*)     eGFR Non  Amer 11 (*)     Anion Gap 32.0 (*)     All other components within normal limits    Narrative:     GFR Normal >60  Chronic Kidney Disease <60  Kidney Failure <15     MAGNESIUM - Abnormal; Notable for the following components:    Magnesium 3.1 (*)     All other components within normal limits   CK - Abnormal; Notable for the following components:    Creatine Kinase 1,470 (*)     All other components within normal limits   CBC WITH AUTO DIFFERENTIAL - Abnormal; Notable for the following components:    WBC 25.40 (*)     RBC 6.50 (*)     Hemoglobin 19.7 (*)     Hematocrit 55.6 (*)     Neutrophil % 84.4 (*)     Lymphocyte % 6.7 (*)     Neutrophils, Absolute 21.40 (*)     Monocytes, Absolute 2.10 (*)     All other components within normal limits   POC LACTATE - Abnormal; Notable for the following components:    Lactate 5.6 (*)     All other components within normal limits   LIPASE - Normal   BLOOD CULTURE   BLOOD CULTURE   URINE DRUG SCREEN   URINALYSIS W/ CULTURE IF INDICATED   HEPATITIS PANEL, ACUTE   COMPREHENSIVE METABOLIC PANEL   RAINBOW DRAW    Narrative:     The following orders were created for panel order [place] Draw.  Procedure                               Abnormality         Status                     ---------                               -----------         ------                     Green Top (Gel)[[id number]]                                                              Lavender Top[[id number]]                                                                Gold Top - SST[[id number]]                                                                Please view results for these tests on the individual orders.   LACTIC ACID, REFLEX   POC LACTATE   CBC AND DIFFERENTIAL    Narrative:     The following orders were created for panel order CBC & Differential.  Procedure                               Abnormality         Status                     ---------                               -----------         ------                     CBC Auto Differential[[id number]]        Abnormal            Final result                 Please view results for these tests on the individual orders.   GREEN TOP   LAVENDER TOP   GOLD TOP - [facility]     Medications   sodium chloride 0.9 % flush 10 mL (has no administration in time range)   sodium chloride 0.9 % flush 10 mL (has no administration in time range)   sodium chloride 0.9 % infusion (has no administration in time range)   potassium chloride (K-DUR,KLOR-CON) CR tablet 20 mEq (has no administration in time range)   cefTRIAXone (ROCEPHIN) in SWFI 1 gram/10ml IV PUSH syringe (has no administration in time range)   lactated ringers bolus 1,000 mL (0 mL Intravenous Stopped 6/22/21 1233)   ondansetron (ZOFRAN) injection 4 mg (4 mg Intravenous Given 6/22/21 1138)   sodium chloride 0.9 % bolus 1,000 mL (1,000 mL Intravenous New Bag 6/22/21 1233)     XR Chest 1 View    Result Date: 6/22/2021   1. Tiny left apical pneumothorax. 2. Pneumomediastinum and subcutaneous emphysema.  Findings discussed with the ER provider at the time of this dictation by telephone  Electronically Signed By-Tom Burnett MD On:6/22/2021 1:03 PM This report was finalized on [id number] by  Tom Burnett MD.    Prior to Admission medications    Medication Sig Start Date End Date Taking? Authorizing Provider   buPROPion XL (Wellbutrin XL) 150 MG 24 hr tablet Take 1 tablet by mouth Daily  for 30 days. 5/7/21 6/6/21  Radha Castellanos, APRN                                          MDM  Number of Diagnoses or Management Options  Acute renal failure, unspecified acute renal failure type (CMS/HCC)  Cannabinoid hyperemesis syndrome  Dehydration  Leukocytosis, unspecified type  Nausea and vomiting, intractability of vomiting not specified, unspecified vomiting type  Non-traumatic rhabdomyolysis  Diagnosis management comments: Chart review: Records obtained from [facility] from June 22, 2021: White blood cell count 24.6, creatinine 1.3      Comorbidities:  has a past medical history of Anxiety (1/2/2019).  Differentials: Renal insufficiency infection dehydration not all inclusive of differentials considered  Discussion with provider: foreign nephrology  Radiology interpretation:  X-rays reviewed by me and interpreted by radiologist,   No radiology results for the last day  Lab interpretation:  Labs viewed by me significant for, creatinine 6.4, sodium 127, chloride 70, CK 1470, white blood cell count 25.4    Appropriate PPE worn during exam.  Patient was given 2 L of fluid and Zofran.  He has not had any further vomiting   Patient screened positive for hospital sepsis policy and orders were placed accordingly. patient was given antibiotics, IVF.  I attest that I have reassessed tissue perfusion after the fluid bolus given.      i discussed findings with patient who voices understanding of admission        Final diagnoses:   Dehydration   Nausea and vomiting, intractability of vomiting not specified, unspecified vomiting type   Leukocytosis, unspecified type   Acute renal failure, unspecified acute renal failure type (CMS/HCC)   Non-traumatic rhabdomyolysis   Cannabinoid hyperemesis syndrome       ED Disposition  ED Disposition     ED Disposition Condition Comment    Decision to Admit  Level of Care: Telemetry [5]   Diagnosis: Dehydration [276.51.ICD-9-CM]   Admitting Physician: MARTIN VEGA  MIKA [[id number]]   Attending Physician: MARTIN VEGA [[id number]]   Bed Request Comments: [facility]            No follow-up provider specified.       Medication List      No changes were made to your prescriptions during this visit.          Michell Martin, JAM  06/22/21 1240       Michell Martin, JAM  06/22/21 1325

## 2021-06-22 NOTE — CONSULTS
RENAL/KCC:    Referring Provider: ER  Reason for Consultation: MITZI    Subjective     Chief complaint: Vomiting    History of present illness:  Patient is a 22 yo with no chronic medical problems who is a heavy marijuana use who presents with one week of severe emesis.  He states he has not voided in 4 days.  His baseline Cr is 1.  He was seen in the ER at [place] yesterday and Cr was 1.3.  It is up to 6.4 here.  He denies any NSAID use.  No CP or SOA.      History  Past Medical History:   Diagnosis Date   • Anxiety 1/2/2019   , No past surgical history on file.,   Family History   Problem Relation Age of Onset   • No Known Problems Mother    • No Known Problems Father    • No Known Problems Maternal Grandmother    • No Known Problems Maternal Grandfather    • Breast cancer Paternal Grandmother    • No Known Problems Paternal Grandfather    ,   Social History     Socioeconomic History   • Marital status: Single     Spouse name: Not on file   • Number of children: Not on file   • Years of education: Not on file   • Highest education level: Not on file   Tobacco Use   • Smoking status: Current Every Day Smoker   • Smokeless tobacco: Never Used   Vaping Use   • Vaping Use: Every day   • Substances: Nicotine, THC, Flavoring   • Devices: Disposable, Pre-filled pod   • Passive vaping exposure Yes   Substance and Sexual Activity   • Alcohol use: Yes     Comment: occ   • Drug use: Yes     Types: Marijuana     Comment: 7 days a week since he was 12.   • Sexual activity: Yes     E-cigarette/Vaping   • E-cigarette/Vaping Use Current Every Day User    • Passive Exposure Yes      E-cigarette/Vaping Substances   • Nicotine Yes    • THC Yes    • CBD No    • Flavoring Yes      E-cigarette/Vaping Devices   • Disposable Yes    • Pre-filled or Refillable Cartridge No    • Refillable Tank No    • Pre-filled Pod Yes        , (Not in a hospital admission)  , Scheduled Meds:  cefTRIAXone, 1 g, Intravenous, Once  potassium chloride, 20 mEq,  Oral, Once    , Continuous Infusions:  sodium chloride, 200 mL/hr    , PRN Meds:  [COMPLETED] Insert peripheral IV **AND** sodium chloride  •  sodium chloride and Allergies:  Patient has no known allergies.    Review of Systems  Pertinent items are noted in HPI    Objective     Vital Signs  Temp:  [97.8 °F (36.6 °C)] 97.8 °F (36.6 °C)  Heart Rate:  [] 78  Resp:  [16-20] 20  BP: (118-146)/(78-96) 146/96    I/O this shift:  In: 3000 [IV Piggyback:3000]  Out: -   No intake/output data recorded.    Physical Exam:  GEN: Awake, NAD  ENT: PERRL, EOMI, MMM  NECK: Supple, no JVD  CHEST: CTAB, no W/R/C  CV: RRR, no M/G/R  ABD: Soft, NT, +BS  SKIN: Warm and Dry  NEURO: CN's intact      Results Review:   I reviewed the patient's new clinical results.    WBC WBC   Date Value Ref Range Status   06/22/2021 25.40 (H) 3.40 - 10.80 10*3/mm3 Final      HGB Hemoglobin   Date Value Ref Range Status   06/22/2021 19.7 (H) 13.0 - 17.7 g/dL Final      HCT Hematocrit   Date Value Ref Range Status   06/22/2021 55.6 (H) 37.5 - 51.0 % Final      Platlets No results found for: LABPLAT   MCV MCV   Date Value Ref Range Status   06/22/2021 85.6 79.0 - 97.0 fL Final          Sodium Sodium   Date Value Ref Range Status   06/22/2021 127 (L) 136 - 145 mmol/L Final      Potassium Potassium   Date Value Ref Range Status   06/22/2021 3.4 (L) 3.5 - 5.2 mmol/L Final     Comment:     Slight hemolysis detected by analyzer. Results may be affected.      Chloride Chloride   Date Value Ref Range Status   06/22/2021 70 (L) 98 - 107 mmol/L Final      CO2 CO2   Date Value Ref Range Status   06/22/2021 25.0 22.0 - 29.0 mmol/L Final      BUN BUN   Date Value Ref Range Status   06/22/2021 75 (H) 6 - 20 mg/dL Final      Creatinine Creatinine   Date Value Ref Range Status   06/22/2021 6.49 (H) 0.76 - 1.27 mg/dL Final      Calcium Calcium   Date Value Ref Range Status   06/22/2021 11.3 (H) 8.6 - 10.5 mg/dL Final      PO4 No results found for: CAPO4   Albumin  Albumin   Date Value Ref Range Status   06/22/2021 5.70 (H) 3.50 - 5.20 g/dL Final      Magnesium Magnesium   Date Value Ref Range Status   06/22/2021 3.1 (H) 1.6 - 2.6 mg/dL Final      Uric Acid No results found for: URICACID         cefTRIAXone, 1 g, Intravenous, Once  potassium chloride, 20 mEq, Oral, Once      sodium chloride, 200 mL/hr        Assessment/Plan     1) MITZI - Pre-renal vs ATN from volume depletion, mild rhabdo  2) Volume depletion  3) Emesis  4) Hyponatremia  5) Hypokalemia  6) HTN  7) Marijuana abuse  8) Mild rhabdo  9) Hypercalcemia  10) Lactic acidosis  11) Leukocytosis - with significant hemoconcentration  12) Polycythemia - likely from hemoconcentration    PLAN: Discussed at length with patient and family.  Continue aggressive IVF tonight but will plan acute HD tomorrow if no UOP and continued rise in Cr as uremia could be exacerbating his emesis.  Replace K and monitor Na.  Avoid nephrotoxins.  Will follow.        Vasquez Boucher MD   [facility]  06/22/21  @NOW

## 2021-06-23 ENCOUNTER — APPOINTMENT (OUTPATIENT)
Dept: GENERAL RADIOLOGY | Facility: HOSPITAL | Age: 21
End: 2021-06-23

## 2021-06-23 LAB
ANION GAP SERPL CALCULATED.3IONS-SCNC: 17 MMOL/L (ref 5–15)
BACTERIA UR QL AUTO: ABNORMAL /HPF
BASOPHILS # BLD AUTO: 0.1 10*3/MM3 (ref 0–0.2)
BASOPHILS NFR BLD AUTO: 0.5 % (ref 0–1.5)
BILIRUB UR QL STRIP: NEGATIVE
BUN SERPL-MCNC: 88 MG/DL (ref 6–20)
BUN/CREAT SERPL: 20.2 (ref 7–25)
CALCIUM SPEC-SCNC: 9.1 MG/DL (ref 8.6–10.5)
CHLORIDE SERPL-SCNC: 85 MMOL/L (ref 98–107)
CK SERPL-CCNC: 1192 U/L (ref 20–200)
CLARITY UR: CLEAR
CO2 SERPL-SCNC: 29 MMOL/L (ref 22–29)
COLOR UR: YELLOW
CREAT SERPL-MCNC: 4.35 MG/DL (ref 0.76–1.27)
CREAT UR-MCNC: 78.4 MG/DL
DEPRECATED RDW RBC AUTO: 39.4 FL (ref 37–54)
EOSINOPHIL # BLD AUTO: 0 10*3/MM3 (ref 0–0.4)
EOSINOPHIL NFR BLD AUTO: 0.1 % (ref 0.3–6.2)
ERYTHROCYTE [DISTWIDTH] IN BLOOD BY AUTOMATED COUNT: 13.1 % (ref 12.3–15.4)
GFR SERPL CREATININE-BSD FRML MDRD: 17 ML/MIN/1.73
GLUCOSE SERPL-MCNC: 113 MG/DL (ref 65–99)
GLUCOSE UR STRIP-MCNC: ABNORMAL MG/DL
HCT VFR BLD AUTO: 47.9 % (ref 37.5–51)
HGB BLD-MCNC: 16.8 G/DL (ref 13–17.7)
HGB UR QL STRIP.AUTO: ABNORMAL
HYALINE CASTS UR QL AUTO: ABNORMAL /LPF
KETONES UR QL STRIP: NEGATIVE
LEUKOCYTE ESTERASE UR QL STRIP.AUTO: NEGATIVE
LYMPHOCYTES # BLD AUTO: 1.1 10*3/MM3 (ref 0.7–3.1)
LYMPHOCYTES NFR BLD AUTO: 7 % (ref 19.6–45.3)
MAGNESIUM SERPL-MCNC: 3.1 MG/DL (ref 1.6–2.6)
MCH RBC QN AUTO: 30.4 PG (ref 26.6–33)
MCHC RBC AUTO-ENTMCNC: 35.2 G/DL (ref 31.5–35.7)
MCV RBC AUTO: 86.5 FL (ref 79–97)
MONOCYTES # BLD AUTO: 1.5 10*3/MM3 (ref 0.1–0.9)
MONOCYTES NFR BLD AUTO: 9.3 % (ref 5–12)
NEUTROPHILS NFR BLD AUTO: 13.4 10*3/MM3 (ref 1.7–7)
NEUTROPHILS NFR BLD AUTO: 83.1 % (ref 42.7–76)
NITRITE UR QL STRIP: NEGATIVE
NRBC BLD AUTO-RTO: 0 /100 WBC (ref 0–0.2)
PH UR STRIP.AUTO: 6 [PH] (ref 5–8)
PHOSPHATE SERPL-MCNC: 6.1 MG/DL (ref 2.5–4.5)
PLATELET # BLD AUTO: 247 10*3/MM3 (ref 140–450)
PMV BLD AUTO: 8.2 FL (ref 6–12)
POTASSIUM SERPL-SCNC: 3.3 MMOL/L (ref 3.5–5.2)
PROCALCITONIN SERPL-MCNC: 0.65 NG/ML (ref 0–0.25)
PROT UR QL STRIP: ABNORMAL
PROT UR-MCNC: 21.5 MG/DL
RBC # BLD AUTO: 5.53 10*6/MM3 (ref 4.14–5.8)
RBC # UR: ABNORMAL /HPF
REF LAB TEST METHOD: ABNORMAL
SODIUM SERPL-SCNC: 131 MMOL/L (ref 136–145)
SP GR UR STRIP: 1.02 (ref 1–1.03)
SQUAMOUS #/AREA URNS HPF: ABNORMAL /HPF
UROBILINOGEN UR QL STRIP: ABNORMAL
WBC # BLD AUTO: 16.1 10*3/MM3 (ref 3.4–10.8)
WBC UR QL AUTO: ABNORMAL /HPF

## 2021-06-23 PROCEDURE — 84156 ASSAY OF PROTEIN URINE: CPT | Performed by: INTERNAL MEDICINE

## 2021-06-23 PROCEDURE — 82570 ASSAY OF URINE CREATININE: CPT | Performed by: INTERNAL MEDICINE

## 2021-06-23 PROCEDURE — 71045 X-RAY EXAM CHEST 1 VIEW: CPT

## 2021-06-23 PROCEDURE — G0378 HOSPITAL OBSERVATION PER HR: HCPCS

## 2021-06-23 PROCEDURE — 81001 URINALYSIS AUTO W/SCOPE: CPT | Performed by: INTERNAL MEDICINE

## 2021-06-23 PROCEDURE — 96361 HYDRATE IV INFUSION ADD-ON: CPT

## 2021-06-23 PROCEDURE — 99226 PR SBSQ OBSERVATION CARE/DAY 35 MINUTES: CPT | Performed by: FAMILY MEDICINE

## 2021-06-23 PROCEDURE — 84145 PROCALCITONIN (PCT): CPT | Performed by: NURSE PRACTITIONER

## 2021-06-23 PROCEDURE — 96376 TX/PRO/DX INJ SAME DRUG ADON: CPT

## 2021-06-23 PROCEDURE — 84100 ASSAY OF PHOSPHORUS: CPT | Performed by: INTERNAL MEDICINE

## 2021-06-23 PROCEDURE — 25010000002 ONDANSETRON PER 1 MG: Performed by: NURSE PRACTITIONER

## 2021-06-23 PROCEDURE — 83735 ASSAY OF MAGNESIUM: CPT | Performed by: INTERNAL MEDICINE

## 2021-06-23 PROCEDURE — 80048 BASIC METABOLIC PNL TOTAL CA: CPT | Performed by: INTERNAL MEDICINE

## 2021-06-23 PROCEDURE — 25810000003 SODIUM CHLORIDE 0.9 % WITH KCL 20 MEQ 20-0.9 MEQ/L-% SOLUTION: Performed by: INTERNAL MEDICINE

## 2021-06-23 PROCEDURE — 82550 ASSAY OF CK (CPK): CPT | Performed by: INTERNAL MEDICINE

## 2021-06-23 PROCEDURE — 85025 COMPLETE CBC W/AUTO DIFF WBC: CPT | Performed by: INTERNAL MEDICINE

## 2021-06-23 PROCEDURE — 25010000002 PROCHLORPERAZINE 10 MG/2ML SOLUTION: Performed by: NURSE PRACTITIONER

## 2021-06-23 PROCEDURE — 63710000001 DIPHENHYDRAMINE PER 50 MG: Performed by: NURSE PRACTITIONER

## 2021-06-23 RX ORDER — CYCLOBENZAPRINE HCL 10 MG
5 TABLET ORAL 3 TIMES DAILY PRN
Status: DISCONTINUED | OUTPATIENT
Start: 2021-06-23 | End: 2021-06-24 | Stop reason: HOSPADM

## 2021-06-23 RX ORDER — CAPSAICIN 0.025 %
CREAM (GRAM) TOPICAL EVERY 12 HOURS SCHEDULED
Status: DISCONTINUED | OUTPATIENT
Start: 2021-06-23 | End: 2021-06-24 | Stop reason: HOSPADM

## 2021-06-23 RX ORDER — SODIUM CHLORIDE AND POTASSIUM CHLORIDE 150; 900 MG/100ML; MG/100ML
125 INJECTION, SOLUTION INTRAVENOUS CONTINUOUS
Status: DISCONTINUED | OUTPATIENT
Start: 2021-06-23 | End: 2021-06-24 | Stop reason: HOSPADM

## 2021-06-23 RX ORDER — POTASSIUM CHLORIDE 20 MEQ/1
20 TABLET, EXTENDED RELEASE ORAL ONCE
Status: COMPLETED | OUTPATIENT
Start: 2021-06-23 | End: 2021-06-23

## 2021-06-23 RX ORDER — DIPHENHYDRAMINE HCL 25 MG
25 CAPSULE ORAL ONCE
Status: COMPLETED | OUTPATIENT
Start: 2021-06-23 | End: 2021-06-23

## 2021-06-23 RX ORDER — ACETAMINOPHEN 500 MG
1000 TABLET ORAL EVERY 8 HOURS
Status: DISCONTINUED | OUTPATIENT
Start: 2021-06-23 | End: 2021-06-24 | Stop reason: HOSPADM

## 2021-06-23 RX ORDER — NICOTINE 21 MG/24HR
1 PATCH, TRANSDERMAL 24 HOURS TRANSDERMAL
Status: DISCONTINUED | OUTPATIENT
Start: 2021-06-23 | End: 2021-06-24 | Stop reason: HOSPADM

## 2021-06-23 RX ORDER — LIDOCAINE 50 MG/G
2 PATCH TOPICAL
Status: DISCONTINUED | OUTPATIENT
Start: 2021-06-23 | End: 2021-06-24 | Stop reason: HOSPADM

## 2021-06-23 RX ORDER — HYDROXYZINE HYDROCHLORIDE 25 MG/1
50 TABLET, FILM COATED ORAL 3 TIMES DAILY PRN
Status: DISCONTINUED | OUTPATIENT
Start: 2021-06-23 | End: 2021-06-24 | Stop reason: HOSPADM

## 2021-06-23 RX ADMIN — SODIUM CHLORIDE AND POTASSIUM CHLORIDE 125 ML/HR: .9; .15 SOLUTION INTRAVENOUS at 18:16

## 2021-06-23 RX ADMIN — Medication 1 PATCH: at 15:14

## 2021-06-23 RX ADMIN — SODIUM CHLORIDE AND POTASSIUM CHLORIDE 125 ML/HR: .9; .15 SOLUTION INTRAVENOUS at 08:38

## 2021-06-23 RX ADMIN — HYDROXYZINE HYDROCHLORIDE 50 MG: 25 TABLET, FILM COATED ORAL at 21:43

## 2021-06-23 RX ADMIN — CYCLOBENZAPRINE HYDROCHLORIDE 5 MG: 10 TABLET, FILM COATED ORAL at 21:42

## 2021-06-23 RX ADMIN — HYDROXYZINE HYDROCHLORIDE 50 MG: 25 TABLET, FILM COATED ORAL at 10:47

## 2021-06-23 RX ADMIN — HYDROXYZINE HYDROCHLORIDE 50 MG: 25 TABLET, FILM COATED ORAL at 17:08

## 2021-06-23 RX ADMIN — ACETAMINOPHEN 1000 MG: 500 TABLET, FILM COATED ORAL at 10:08

## 2021-06-23 RX ADMIN — PROCHLORPERAZINE EDISYLATE 5 MG: 5 INJECTION INTRAMUSCULAR; INTRAVENOUS at 00:47

## 2021-06-23 RX ADMIN — ACETAMINOPHEN 1000 MG: 500 TABLET, FILM COATED ORAL at 17:07

## 2021-06-23 RX ADMIN — CAPSAICIN: 0.25 CREAM TOPICAL at 10:48

## 2021-06-23 RX ADMIN — ACETAMINOPHEN 650 MG: 325 TABLET, FILM COATED ORAL at 03:36

## 2021-06-23 RX ADMIN — POTASSIUM CHLORIDE 20 MEQ: 1500 TABLET, EXTENDED RELEASE ORAL at 08:38

## 2021-06-23 RX ADMIN — Medication 10 ML: at 21:45

## 2021-06-23 RX ADMIN — ONDANSETRON 4 MG: 2 INJECTION INTRAMUSCULAR; INTRAVENOUS at 21:09

## 2021-06-23 RX ADMIN — DIPHENHYDRAMINE HYDROCHLORIDE 25 MG: 25 CAPSULE ORAL at 21:42

## 2021-06-23 RX ADMIN — LIDOCAINE 2 PATCH: 50 PATCH TOPICAL at 10:08

## 2021-06-23 RX ADMIN — CYCLOBENZAPRINE HYDROCHLORIDE 5 MG: 10 TABLET, FILM COATED ORAL at 10:47

## 2021-06-23 RX ADMIN — SODIUM CHLORIDE AND POTASSIUM CHLORIDE 125 ML/HR: .9; .15 SOLUTION INTRAVENOUS at 08:39

## 2021-06-23 RX ADMIN — SODIUM CHLORIDE 200 ML/HR: 9 INJECTION, SOLUTION INTRAVENOUS at 01:18

## 2021-06-23 RX ADMIN — Medication 10 ML: at 08:39

## 2021-06-23 NOTE — CASE MANAGEMENT/SOCIAL WORK
Discharge Planning Assessment  [facility]     Patient Name: Trey Ndiaye  MRN: [id number]  Today's Date: 6/23/2021    Admit Date: 6/22/2021    Discharge Needs Assessment     Row Name 06/23/21 1311       Living Environment    Lives With  parent(s)    Current Living Arrangements  home/apartment/condo    Primary Care Provided by  self    Provides Primary Care For  no one    Family Caregiver if Needed  friend(s);parent(s)    Able to Return to Prior Arrangements  yes       Resource/Environmental Concerns    Resource/Environmental Concerns  none    Transportation Concerns  car, none       Transition Planning    Patient/Family Anticipates Transition to  home with family    Patient/Family Anticipated Services at Transition      Transportation Anticipated  family or friend will provide       Discharge Needs Assessment    Readmission Within the Last 30 Days  no previous admission in last 30 days    Equipment Currently Used at Home  none    Concerns to be Addressed  no discharge needs identified;denies needs/concerns at this time    Anticipated Changes Related to Illness  none    Equipment Needed After Discharge  none    Provided Post Acute Provider List?  N/A    Provided Post Acute Provider Quality & Resource List?  N/A        Discharge Plan     Row Name 06/23/21 1312       Plan    Plan  anticipate routine home with parents    Patient/Family in Agreement with Plan  yes    Plan Comments  met with patient at bedside. patient lives at home with parents. patient does not drive. patient parents or friend provides transportation. pcp and pharm confirmed. denies issues affording food or meds. independent with ADLs. denies needs. MSW notified of current drug use. DC barriers: renal following, poss need for HD, ivf, blood cx pending, renal ultrasound today        Expected Discharge Date and Time     Expected Discharge Date Expected Discharge Time    Jun 25, 2021         Demographic Summary     Row Name 06/23/21 8024        General Information    Admission Type  observation    Arrived From  emergency department    Referral Source  admission list    Reason for Consult  discharge planning    Preferred Language  English     Used During This Interaction  no        Functional Status     Row Name 06/23/21 1311       Functional Status    Usual Activity Tolerance  moderate    Current Activity Tolerance  moderate       Functional Status, IADL    Medications  independent    Meal Preparation  independent    Housekeeping  independent    Laundry  independent    Shopping  independent        Met with patient in room wearing PPE: mask, face shield/goggles    Maintained distance greater than six feet and spent less than 15 minutes in the room.          Radha Howell RN

## 2021-06-23 NOTE — CASE MANAGEMENT/SOCIAL WORK
Social Work Assessment  [facility]     Patient Name: Trey Ndiaye  MRN: [id number]  Today's Date: 6/23/2021    Admit Date: 6/22/2021    Discharge Plan     Row Name 06/23/21 1617       Plan    Plan  Anticipate routine home with paretns - father to transport at discharge.    Plan Comments  SW inquired about patient’s housing. Patient had informed RN LACY that he lived at home with his parents, but notified nursing that his mother would not let him be at home due to him being sick and that he was staying at a hotel. When SW asked patient about housing, patient reports living with his parents and that he has been “hanging out” at his girlfriend’s more frequently. SW inquired if girlfriend was friend visiting from previous day, which patient declined. Patient states he plans to return home at discharge with his father transporting at discharge. Patient story not consistent with different members of treatment team. See substance abuse assessment in SW note 6/23.     Substance Abuse     Row Name 06/23/21 1618       Substance Use    Substance Use Status  current street drug/inhalant/medication abuse    Last Street Drug/Medication/Inhalant Use  06/22/21    Attempts to Quit Street Drug Use  none    Street Drug Withdrawal Pattern  vomiting;nausea    Previous Substance Use Treatment  none    Substance Use Comment  Met with patient at bedside due to consult regarding housing concerns and substance abuse. Patient sleeping at time of attempt, therefore SW woke patient up. Had to continue to wake patient up during conversation. Patient tells this SW that he does smoke marijuana (per chart review 2-4times/day) and has been using for the last nine years. Encouraged cessation due to increase in nausea/vomiting with use over the last week. Patient denies other illicit substance use aside from the isolated incident of taking Xanax from friend visiting at hospital on 6/22 (event per nursing, but patient confirmed this took place).     Met  with patient in room wearing PPE: mask, face shield/goggles, gloves, gown.      Maintained distance greater than six feet and spent less than 15 minutes in the room.      CHRISTOPHER Ramirez    Phone: [phone]  Cell: [phone]  Fax: [phone]  [email or web address]

## 2021-06-23 NOTE — PROGRESS NOTES
[facility] Medicine Services Daily Progress Note      Hospitalist Team  LOS 0 days      Patient Care Team:  Radha Castellanos APRN as PCP - General (Family Medicine)    Patient Location: 2109/1      Subjective   Subjective     Chief Complaint / Subjective  Chief Complaint   Patient presents with   • Abdominal Pain     Patient reports nausea still present but improving.  Abdominal pain improving.  Renal function improving with IV hydration.  Patient requesting medication for pain and anxiety.  Tolerating clear liquids.    Brief Synopsis of Hospital Course/HPI    Mr. Ndiaye is a 21 y.o. male with PMH of cannabinoid hyperemesis syndrome presented to [facility] ED 6/22/2021 with intractable nausea and vomiting for the last 4-5 days.  He reports generalized pain/body aches and generalized abdominal pain.  He denied any diarrhea.  He denied any fever.  He is unable to keep anything down orally.  He has lost weight. He has not been able to urinate. He was evaluated at [facility] on 6/20/2021 and left the hospital AGAINST MEDICAL ADVICE.  He smokes marijuana 2-4 times a day with last use 4 to 5 days ago.     In the ED the patient was found to have BUN 75 and creatinine 6.49.  Severe electrolyte abnormalities with sodium 127, potassium 3.4, chloride 70, calcium 11.3.  CK total 1470.  WBC 25.4, lactate 5.6.  Chest x-ray showed tiny left apical pneumothorax, pneumomediastinum and subcutaneous emphysema.  He was afebrile, not tachycardic, normotensive.  He was given 2 L IV fluid boluses, IV Zofran, and IV Rocephin.  Neurology was consulted.  He was admitted to the hospitalist group for further treatment of intractable nausea and vomiting secondary to cannabinoid hyperemesis syndrome resulting in dehydration and acute renal failure.       Date::          Review of Systems   Constitutional: Negative for chills and fever.   HENT: Negative for hoarse voice and stridor.    Eyes: Negative for  "double vision and photophobia.   Cardiovascular: Negative for chest pain and palpitations.   Respiratory: Negative for cough and shortness of breath.    Musculoskeletal: Negative for falls and myalgias.   Gastrointestinal: Positive for abdominal pain and nausea.   Genitourinary: Negative for dysuria and flank pain.   Neurological: Negative for dizziness and seizures.   Psychiatric/Behavioral: Positive for substance abuse. Negative for altered mental status. The patient is nervous/anxious.          Objective   Objective      Vital Signs  Temp:  [97.6 °F (36.4 °C)-99.7 °F (37.6 °C)] 97.6 °F (36.4 °C)  Heart Rate:  [] 70  Resp:  [17-22] 18  BP: (117-151)/(66-99) 129/70  Oxygen Therapy  SpO2: 96 %  Pulse Oximetry Type: Continuous  Device (Oxygen Therapy): room air  Flowsheet Rows      First Filed Value   Admission Height  180.3 cm (71\") Documented at 06/22/2021 1023   Admission Weight  49.9 kg (110 lb) Documented at 06/22/2021 1023        Intake & Output (last 3 days)       06/20 0701 - 06/21 0700 06/21 0701 - 06/22 0700 06/22 0701 - 06/23 0700 06/23 0701 - 06/24 0700    P.O.    360    I.V. (mL/kg)   1000 (17)     IV Piggyback   4000     Total Intake(mL/kg)   5000 (85) 360 (6.1)    Urine (mL/kg/hr)   600     Emesis/NG output   200     Total Output   800     Net   +4200 +360                Lines, Drains & Airways    Active LDAs     Name:   Placement date:   Placement time:   Site:   Days:    Peripheral IV 06/22/21 1121 Left Antecubital   06/22/21    1121    Antecubital   1                  Physical Exam:    Physical Exam  General: Male lying in bed breathing comfortably on room air no acute distress  HEENT: NC/AT, EOMI, mucosa dry  Heart: Regular, rate controlled  Chest: Normal work of breathing, moving air well  Abdominal: Soft.  Epigastric tenderness, nondistended  Musculoskeletal: Normal ROM.  No edema. No calf tenderness.  Neurological: AAOx3, no focal deficits  Skin: Skin is warm and dry. No rash  Psychiatric: " Normal mood and affect.       Wounds (last 24 hours)      LDA Wound     Row Name 06/23/21 0800 06/23/21 0401 06/23/21 0009       Wound 06/22/21 2015 lower thoracic spine Abrasion    Wound - Properties Group Placement Date: 06/22/21  -CW Placement Time: 2015  -CW Present on Hospital Admission: Y  -CW Orientation: lower  -CW Location: thoracic spine  -CW Primary Wound Type: Abrasion  -CW Stage, Pressure Injury : other (see comments)  -CW, healing abrasion     Dressing Appearance  open to air  -VA  --  --    Closure  Open to air  -VA  Open to air  -CW  Open to air  -CW    Base  blanchable;clean;dry;granulating  -VA  blanchable;clean;dry;granulating  -CW  blanchable;clean;dry;granulating  -CW    Periwound  intact;dry;blanchable;pink  -VA  intact;dry;blanchable;pink  -CW  intact;dry;blanchable;pink  -CW    Drainage Amount  none  -VA  none  -CW  none  -CW    Retired Wound - Properties Group Date first assessed: 06/22/21  -CW Time first assessed: 2015  -CW Present on Hospital Admission: Y  -CW Location: thoracic spine  -CW Primary Wound Type: Abrasion  -CW       Wound 06/22/21 2015 thoracic spine Abrasion    Wound - Properties Group Placement Date: 06/22/21  -CW Placement Time: 2015  -CW Present on Hospital Admission: Y  -CW Location: thoracic spine  -CW Primary Wound Type: Abrasion  -CW    Dressing Appearance  open to air  -VA  --  --    Closure  Open to air  -VA  Open to air  -CW  Open to air  -CW    Base  blanchable;clean;dry;granulating  -VA  blanchable;clean;dry;granulating  -CW  blanchable;clean;dry;granulating  -CW    Periwound  intact;dry;pink  -VA  intact;dry;pink  -CW  intact;dry;pink  -CW    Drainage Amount  none  -VA  none  -CW  none  -CW    Retired Wound - Properties Group Date first assessed: 06/22/21  -CW Time first assessed: 2015 -CW Present on Hospital Admission: Y  -CW Location: thoracic spine  -CW Primary Wound Type: Abrasion  -CW       Wound 06/22/21 2015 Left proximal arm Other (comment)    Wound -  Properties Group Placement Date: 06/22/21  -CW Placement Time: 2015 -CW Side: Left  -CW Orientation: proximal  -CW Location: arm  -CW Primary Wound Type: Other  -CW    Dressing Appearance  open to air  -VA  --  --    Closure  Open to air  -VA  Open to air  -CW  Open to air  -CW    Periwound  intact;dry  -VA  intact;dry  -CW  intact;dry  -CW    Drainage Amount  none  -VA  none  -CW  none  -CW    Retired Wound - Properties Group Date first assessed: 06/22/21  -CW Time first assessed: 2015 -CW Side: Left  -CW Location: arm  -CW Primary Wound Type: Other  -CW    Row Name 06/22/21 2015             Wound 06/22/21 2015 lower thoracic spine Abrasion    Wound - Properties Group Placement Date: 06/22/21  -CW Placement Time: 2015 -CW Present on Hospital Admission: Y  -CW Orientation: lower  -CW Location: thoracic spine  -CW Primary Wound Type: Abrasion  -CW Stage, Pressure Injury : other (see comments)  -CW, healing abrasion     Dressing Appearance  open to air  -CW      Closure  Open to air  -CW      Base  blanchable;clean;dry;granulating  -CW      Periwound  intact;dry;blanchable;pink  -CW      Drainage Amount  none  -CW      Care, Wound  cleansed with;soap and water  -CW      Retired Wound - Properties Group Date first assessed: 06/22/21  -CW Time first assessed: 2015 -CW Present on Hospital Admission: Y  -CW Location: thoracic spine  -CW Primary Wound Type: Abrasion  -CW       Wound 06/22/21 2015 thoracic spine Abrasion    Wound - Properties Group Placement Date: 06/22/21  -CW Placement Time: 2015 -CW Present on Hospital Admission: Y  -CW Location: thoracic spine  -CW Primary Wound Type: Abrasion  -CW    Dressing Appearance  open to air  -CW      Closure  Open to air  -CW      Base  blanchable;clean;dry;granulating  -CW      Periwound  intact;dry;pink  -CW      Drainage Amount  none  -CW      Retired Wound - Properties Group Date first assessed: 06/22/21  -CW Time first assessed: 2015 -CW Present on Hospital  Admission: Y  -CW Location: thoracic spine  -CW Primary Wound Type: Abrasion  -CW       Wound 06/22/21 2015 Left proximal arm Other (comment)    Wound - Properties Group Placement Date: 06/22/21  -CW Placement Time: 2015 -CW Side: Left  -CW Orientation: proximal  -CW Location: arm  -CW Primary Wound Type: Other  -CW    Dressing Appearance  open to air  -CW      Closure  Open to air  -CW      Periwound  intact;dry  -CW      Drainage Amount  none  -CW      Care, Wound  cleansed with;soap and water  -CW      Retired Wound - Properties Group Date first assessed: 06/22/21  -CW Time first assessed: 2015 -CW Side: Left  -CW Location: arm  -CW Primary Wound Type: Other  -CW      User Key  (r) = Recorded By, (t) = Taken By, (c) = Cosigned By    Initials Name Provider Type    Cynthia Singer, RN Registered Nurse    Cassie Morales RN Registered Nurse          Procedures:              Results Review:     I reviewed the patient's new clinical results.      Lab Results (last 24 hours)     Procedure Component Value Units Date/Time    Protein, Urine, Random - [[id number]] Collected: 06/23/21 1116    Specimen: Urine Updated: 06/23/21 1142     Total Protein, Urine 21.5 mg/dL     Narrative:      Reference intervals for random urine have not been established.  Clinical usage is dependent upon physician's interpretation in combination with other laboratory tests.       Urinalysis, Microscopic Only - Urine, Clean Catch [[id number]]  (Abnormal) Collected: 06/23/21 1116    Specimen: Urine Updated: 06/23/21 1128     RBC, UA 3-5 /HPF      WBC, UA 3-5 /HPF      Bacteria, UA None Seen /HPF      Squamous Epithelial Cells, UA None Seen /HPF      Hyaline Casts, UA None Seen /LPF      Methodology Automated Microscopy    Urinalysis With Microscopic If Indicated (No Culture) - [[id number]]  (Abnormal) Collected: 06/23/21 1116    Specimen: Urine Updated: 06/23/21 1128     Color, UA Yellow     Appearance, UA Clear     pH, UA 6.0      "Specific Gravity, UA 1.021     Glucose, UA >=1000 mg/dL (3+)     Ketones, UA Negative     Bilirubin, UA Negative     Blood, UA Large (3+)     Protein, UA Trace     Leuk Esterase, UA Negative     Nitrite, UA Negative     Urobilinogen, UA 0.2 E.U./dL    Creatinine, Urine, Random - Urine, Clean Catch [[id number]] Collected: 06/23/21 1116    Specimen: Urine, Clean Catch Updated: 06/23/21 1121    Procalcitonin [[id number]]  (Abnormal) Collected: 06/23/21 0237    Specimen: Blood Updated: 06/23/21 0500     Procalcitonin 0.65 ng/mL     Narrative:      As a Marker for Sepsis (Non-Neonates):     1. <0.5 ng/mL represents a low risk of severe sepsis and/or septic shock.  2. >2 ng/mL represents a high risk of severe sepsis and/or septic shock.    As a Marker for Lower Respiratory Tract Infections that require antibiotic therapy:  PCT on Admission     Antibiotic Therapy             6-12 Hrs later  >0.5                          Strongly Recommended            >0.25 - <0.5             Recommended  0.1 - 0.25                  Discouraged                       Remeasure/reassess PCT  <0.1                         Strongly Discouraged         Remeasure/reassess PCT      As 28 day mortality risk marker: \"Change in Procalcitonin Result\" (>80% or <=80%) if Day 0 (or Day 1) and Day 4 values are available. Refer to http://www.[employer]-pct-calculator.com    Change in PCT <=80%   A decrease of PCT levels below or equal to 80% defines a positive change in PCT test result representing a higher risk for 28-day all-cause mortality of patients diagnosed with severe sepsis or septic shock.    Change in PCT >80%   A decrease of PCT levels of more than 80% defines a negative change in PCT result representing a lower risk for 28-day all-cause mortality of patients diagnosed with severe sepsis or septic shock.    This test is Prognostic not Diagnostic, if elevated correlate with clinical findings before administering antibiotic treatment.      Results may " be falsely decreased if patient taking Biotin.     Basic Metabolic Panel [[id number]]  (Abnormal) Collected: 06/23/21 0237    Specimen: Blood Updated: 06/23/21 0313     Glucose 113 mg/dL      BUN 88 mg/dL      Creatinine 4.35 mg/dL      Sodium 131 mmol/L      Potassium 3.3 mmol/L      Comment: Slight hemolysis detected by analyzer. Results may be affected.        Chloride 85 mmol/L      Comment: Result checked         CO2 29.0 mmol/L      Calcium 9.1 mg/dL      eGFR Non African Amer 17 mL/min/1.73      BUN/Creatinine Ratio 20.2     Anion Gap 17.0 mmol/L     Narrative:      GFR Normal >60  Chronic Kidney Disease <60  Kidney Failure <15      CK [[id number]]  (Abnormal) Collected: 06/23/21 0237    Specimen: Blood Updated: 06/23/21 0312     Creatine Kinase 1,192 U/L     Phosphorus [[id number]]  (Abnormal) Collected: 06/23/21 0237    Specimen: Blood Updated: 06/23/21 0312     Phosphorus 6.1 mg/dL     Magnesium [[id number]]  (Abnormal) Collected: 06/23/21 0237    Specimen: Blood Updated: 06/23/21 0312     Magnesium 3.1 mg/dL     CBC & Differential [[id number]]  (Abnormal) Collected: 06/23/21 0237    Specimen: Blood Updated: 06/23/21 0247    Narrative:      The following orders were created for panel order CBC & Differential.  Procedure                               Abnormality         Status                     ---------                               -----------         ------                     CBC Auto Differential[[id number]]        Abnormal            Final result                 Please view results for these tests on the individual orders.    CBC Auto Differential [[id number]]  (Abnormal) Collected: 06/23/21 0237    Specimen: Blood Updated: 06/23/21 0247     WBC 16.10 10*3/mm3      RBC 5.53 10*6/mm3      Hemoglobin 16.8 g/dL      Comment: Result checked         Hematocrit 47.9 %      MCV 86.5 fL      MCH 30.4 pg      MCHC 35.2 g/dL      RDW 13.1 %      RDW-SD 39.4 fl      MPV 8.2 fL      Platelets 247 10*3/mm3       Neutrophil % 83.1 %      Lymphocyte % 7.0 %      Monocyte % 9.3 %      Eosinophil % 0.1 %      Basophil % 0.5 %      Neutrophils, Absolute 13.40 10*3/mm3      Lymphocytes, Absolute 1.10 10*3/mm3      Monocytes, Absolute 1.50 10*3/mm3      Eosinophils, Absolute 0.00 10*3/mm3      Basophils, Absolute 0.10 10*3/mm3      nRBC 0.0 /100 WBC     COVID PRE-OP / PRE-PROCEDURE SCREENING ORDER (NO ISOLATION) - Swab, Nasopharynx [[id number]]  (Normal) Collected: 06/22/21 1438    Specimen: Swab from Nasopharynx Updated: 06/22/21 2315    Narrative:      The following orders were created for panel order COVID PRE-OP / PRE-PROCEDURE SCREENING ORDER (NO ISOLATION) - Swab, Nasopharynx.  Procedure                               Abnormality         Status                     ---------                               -----------         ------                     COVID-19,APTIMA PANTHER,...[[id number]]  Normal              Final result                 Please view results for these tests on the individual orders.    COVID-19,APTIMA PANTHER,[facility] IN-HOUSE, NP/OP SWAB IN UTM/VTM/SALINE TRANSPORT MEDIA,24 HR TAT - Swab, Nasopharynx [[id number]]  (Normal) Collected: 06/22/21 1438    Specimen: Swab from Nasopharynx Updated: 06/22/21 2315     COVID19 Not Detected    Narrative:      Fact sheet for providers: https://www.fda.gov/media/757860/download     Fact sheet for patients: https://www.fda.gov/media/788789/download    Test performed by RT PCR.    Urinalysis, Microscopic Only - Urine, Clean Catch [[id number]]  (Abnormal) Collected: 06/22/21 1947    Specimen: Urine, Clean Catch Updated: 06/22/21 2143     RBC, UA 3-5 /HPF      WBC, UA 3-5 /HPF      Bacteria, UA 1+ /HPF      Squamous Epithelial Cells, UA 3-6 /HPF      Hyaline Casts, UA 7-12 /LPF      Coarse Granular Casts, UA 3-6 /LPF      Methodology Manual Light Microscopy    Urinalysis With Culture If Indicated - Urine, Clean Catch [[id number]]  (Abnormal) Collected: 06/22/21 1947    Specimen: Urine,  Clean Catch Updated: 06/22/21 2113     Color, UA Yellow     Appearance, UA Hazy     pH, UA <=5.0     Specific Gravity, UA 1.021     Glucose,  mg/dL (Trace)     Ketones, UA Negative     Bilirubin, UA Negative     Blood, UA Large (3+)     Protein,  mg/dL (2+)     Leuk Esterase, UA Negative     Nitrite, UA Negative     Urobilinogen, UA 0.2 E.U./dL    Urine Drug Screen - Urine, Clean Catch [[id number]]  (Abnormal) Collected: 06/22/21 1947    Specimen: Urine, Clean Catch Updated: 06/22/21 2015     Amphet/Methamphet, Screen Negative     Barbiturates Screen, Urine Negative     Benzodiazepine Screen, Urine Positive     Cocaine Screen, Urine Negative     Opiate Screen Negative     THC, Screen, Urine Positive     Methadone Screen, Urine Negative     Oxycodone Screen, Urine Negative    Narrative:      Negative Thresholds Per Drugs Screened:    Amphetamines                 500 ng/ml  Barbiturates                 200 ng/ml  Benzodiazepines              100 ng/ml  Cocaine                      300 ng/ml  Methadone                    300 ng/ml  Opiates                      300 ng/ml  Oxycodone                    100 ng/ml  THC                           50 ng/ml    The Normal Value for all drugs tested is negative. This report includes final unconfirmed screening results to be used for medical treatment purposes only. Unconfirmed results must not be used for non-medical purposes such as employment or legal testing. Clinical consideration should be applied to any drug of abuse test, particularly when unconfirmed results are used.          All urine drugs of abuse requests without chain of custody are for medical screening purposes only.  False positives are possible.      Timed Lactic Acid, Reflex [[id number]]  (Normal) Collected: 06/22/21 1913    Specimen: Blood Updated: 06/22/21 1952     Lactate 2.0 mmol/L         No results found for: HGBA1C            Lab Results   Component Value Date    LIPASE 25 06/22/2021     Lab  Results   Component Value Date    CHOL 119 12/03/2018    TRIG 69 12/03/2018    HDL 53 12/03/2018    LDL 56 12/03/2018       No results found for: INTRAOP, PREDX, FINALDX, COMDX    Microbiology Results (last 10 days)     Procedure Component Value - Date/Time    COVID PRE-OP / PRE-PROCEDURE SCREENING ORDER (NO ISOLATION) - Swab, Nasopharynx [[id number]]  (Normal) Collected: 06/22/21 1438    Lab Status: Final result Specimen: Swab from Nasopharynx Updated: 06/22/21 2315    Narrative:      The following orders were created for panel order COVID PRE-OP / PRE-PROCEDURE SCREENING ORDER (NO ISOLATION) - Swab, Nasopharynx.  Procedure                               Abnormality         Status                     ---------                               -----------         ------                     COVID-19,APTIMA PANTHER,...[[id number]]  Normal              Final result                 Please view results for these tests on the individual orders.    COVID-19,APTIMA PANTHER,[facility] IN-[facility], NP/OP SWAB IN UTM/VTM/SALINE TRANSPORT MEDIA,24 HR TAT - Swab, Nasopharynx [[id number]]  (Normal) Collected: 06/22/21 1438    Lab Status: Final result Specimen: Swab from Nasopharynx Updated: 06/22/21 2315     COVID19 Not Detected    Narrative:      Fact sheet for providers: https://www.fda.gov/media/006884/download     Fact sheet for patients: https://www.fda.gov/media/016112/download    Test performed by RT PCR.          ECG/EMG Results (most recent)     Procedure Component Value Units Date/Time    ECG 12 Lead [[id number]] Collected: 06/22/21 1358     Updated: 06/22/21 1647     QT Interval 351 ms     Narrative:      HEART RATE= 87  bpm  RR Interval= 692  ms  DE Interval= 155  ms  P Horizontal Axis= 22  deg  P Front Axis= 93  deg  QRSD Interval= 93  ms  QT Interval= 351  ms  QRS Axis= -1  deg  T Wave Axis= 76  deg  - NORMAL ECG -  Sinus rhythm  No previous ECG available for comparison  Electronically Signed By:   Date and Time of Study: 2021-06-22  13:58:03    ECG 12 Lead [[id number]] Collected: 06/22/21 1731     Updated: 06/22/21 1750     QT Interval 364 ms     Narrative:      HEART RATE= 79  bpm  RR Interval= 760  ms  [place] Interval= 136  ms  P Horizontal Axis= 28  deg  P Front Axis= -27  deg  QRSD Interval= 93  ms  QT Interval= 364  ms  QRS Axis= 37  deg  T Wave Axis= 56  deg  - NORMAL ECG -  Sinus rhythm  Electronically Signed By:   Date and Time of Study: 2021-06-22 17:31:56                  XR Chest 1 View    Result Date: 6/22/2021   1. Tiny left apical pneumothorax. 2. Pneumomediastinum and subcutaneous emphysema.  Findings discussed with the ER provider at the time of this dictation by telephone  Electronically Signed By-Tom Burnett MD On:6/22/2021 1:03 PM This report was finalized on [id number] by  Tom Burnett MD.    US Renal Bilateral    Result Date: 6/22/2021  1. Echogenicity of the renal parenchyma is consistent with medical renal disease. 2. Negative for hydronephrosis. 3. There are particles floating in the bladder which could be due to inflammatory debris.    Electronically Signed By-Elena Jade MD On:6/22/2021 8:24 PM This report was finalized on [id number] by  Elena Jade MD.          Xrays, labs reviewed personally by physician.    Medication Review:   I have reviewed the patient's current medication list      Scheduled Meds  acetaminophen, 1,000 mg, Oral, Q8H  capsaicin, , Topical, Q12H  lidocaine, 2 patch, Transdermal, Q24H  sodium chloride, 10 mL, Intravenous, Q12H        Meds Infusions  sodium chloride 0.9 % with KCl 20 mEq, 125 mL/hr, Last Rate: 125 mL/hr (06/23/21 0839)        Meds PRN  aluminum-magnesium hydroxide-simethicone  •  cyclobenzaprine  •  hydrOXYzine  •  ketamine (KETALAR) IVPB **AND** Ketamine Vital Signs & Assessment  •  melatonin  •  ondansetron **OR** ondansetron  •  prochlorperazine  •  [COMPLETED] Insert peripheral IV **AND** sodium chloride  •  sodium chloride  •  sodium chloride        Assessment/Plan    Assessment/Plan     Active Hospital Problems    Diagnosis  POA   • **Acute renal failure (ARF) (CMS/HCC) [N17.9]  Yes   • Dehydration [E86.0]  Yes   • Intractable nausea and vomiting [R11.2]  Yes   • Rhabdomyolysis [M62.82]  Yes   • Electrolyte imbalance [E87.8]  Yes   • Lactic acidosis [E87.2]  Yes   • Leukocytosis [D72.829]  Yes   • Pneumothorax, left [J93.9]  Yes   • Pneumomediastinum (CMS/HCC) [J98.2]  Yes      Resolved Hospital Problems   No resolved problems to display.       MEDICAL DECISION MAKING COMPLEXITY BY PROBLEM:     Nausea/vomiting-at least some component of cannabis hyperemesis  -Antiemetics  -IV fluids  -Clears advance as tolerated  -Blood cultures ordered  -Patient with some muscle spasms trial of muscle relaxer     Renal failure-most likely acute in nature given patient's normal renal function the last few days likely prerenal in nature potential for ATN due to prolonged hypoperfusion  -IV fluids  -Renal ultrasound showing no hydronephrosis but signs of medical renal disease  -Nephrology consult  -Monitoring proteinuria via nephrology  -Electrolytes per nephrology     Hyponatremia-most likely hypovolemic in nature  -Monitor closely with IV hydration     Hypermagnesemia-due to renal dysfunction  -Monitor daily     Rhabdomyolysis-likely due to severe volume depletion  -IV fluids  -Monitor CK  -Monitor urine output     Lactic acidosis-most likely due to volume depletion  -IV fluids  -Can trend for now     Leukocytosis-stress response versus underlying gastritis  -Monitor daily  -No antibiotics for now     Pneumothorax-small, patient hemodynamically stable with no respiratory distress likely due to vomiting  -Serial chest x-rays  -Monitor O2      Marijuana use-patient with signs of cannabis hyperemesis  -Cessation advised  -Urine toxicology positive for THC and benzodiazepines, patient reported as being given benzodiazepines but does not have a prescription  -Atarax added for anxiety    VTE  Prophylaxis -   Mechanical Order History:      Ordered        06/22/21 1343  Place Sequential Compression Device  Once         06/22/21 1343  Maintain Sequential Compression Device  Continuous                 Pharmalogical Order History:     None            Code Status -   Code Status and Medical Interventions:   Ordered at: 06/22/21 1343     Level Of Support Discussed With:    Patient     Code Status:    CPR     Medical Interventions (Level of Support Prior to Arrest):    Full       This patient has been examined wearing appropriate Personal Protective Equipment and discussed with hospital infection control department. 06/23/21        Discharge Planning  Home when medically stable        Electronically signed by Bossman Russell MD, 06/23/21, 12:33 EDT.  [facility] Hospitalist Team

## 2021-06-23 NOTE — PROGRESS NOTES
"RENAL/KCC:     LOS: 0 days    Patient Care Team:  Radha Castellanos APRN as PCP - General (Family Medicine)    Chief Complaint:  Emesis    Subjective     Interval History:   Chart reviewed.  Still with emesis but feeling some better.  Ambulating in the room.  On IVF.  Making urine.    Objective     Vital Sign Min/Max for last 24 hours  Temp  Min: 97.8 °F (36.6 °C)  Max: 99.7 °F (37.6 °C)   BP  Min: 117/66  Max: 151/83   Pulse  Min: 75  Max: 120   Resp  Min: 16  Max: 22   SpO2  Min: 94 %  Max: 100 %   No data recorded   Weight  Min: 49.9 kg (110 lb)  Max: 58.8 kg (129 lb 10.1 oz)     Flowsheet Rows      First Filed Value   Admission Height  180.3 cm (71\") Documented at 06/22/2021 1023   Admission Weight  49.9 kg (110 lb) Documented at 06/22/2021 1023          No intake/output data recorded.  I/O last 3 completed shifts:  In: 5000 [I.V.:1000; IV Piggyback:4000]  Out: 800 [Urine:600; Emesis/NG output:200]    Physical Exam:  GEN: Awake, NAD  ENT: PERRL, EOMI, MMM  NECK: Supple, no JVD  CHEST: CTAB, no W/R/C  CV: RRR, no M/G/R  ABD: Soft, NT, +BS  SKIN: Warm and Dry  NEURO: CN's intact      WBC WBC   Date Value Ref Range Status   06/23/2021 16.10 (H) 3.40 - 10.80 10*3/mm3 Final   06/22/2021 25.40 (H) 3.40 - 10.80 10*3/mm3 Final      HGB Hemoglobin   Date Value Ref Range Status   06/23/2021 16.8 13.0 - 17.7 g/dL Final     Comment:     Result checked    06/22/2021 19.7 (H) 13.0 - 17.7 g/dL Final      HCT Hematocrit   Date Value Ref Range Status   06/23/2021 47.9 37.5 - 51.0 % Final   06/22/2021 55.6 (H) 37.5 - 51.0 % Final      Platlets No results found for: LABPLAT   MCV MCV   Date Value Ref Range Status   06/23/2021 86.5 79.0 - 97.0 fL Final   06/22/2021 85.6 79.0 - 97.0 fL Final          Sodium Sodium   Date Value Ref Range Status   06/23/2021 131 (L) 136 - 145 mmol/L Final   06/22/2021 127 (L) 136 - 145 mmol/L Final      Potassium Potassium   Date Value Ref Range Status   06/23/2021 3.3 (L) 3.5 - 5.2 mmol/L Final    "  Comment:     Slight hemolysis detected by analyzer. Results may be affected.   06/22/2021 3.4 (L) 3.5 - 5.2 mmol/L Final     Comment:     Slight hemolysis detected by analyzer. Results may be affected.      Chloride Chloride   Date Value Ref Range Status   06/23/2021 85 (L) 98 - 107 mmol/L Final     Comment:     Result checked    06/22/2021 70 (L) 98 - 107 mmol/L Final      CO2 CO2   Date Value Ref Range Status   06/23/2021 29.0 22.0 - 29.0 mmol/L Final   06/22/2021 25.0 22.0 - 29.0 mmol/L Final      BUN BUN   Date Value Ref Range Status   06/23/2021 88 (H) 6 - 20 mg/dL Final   06/22/2021 75 (H) 6 - 20 mg/dL Final      Creatinine Creatinine   Date Value Ref Range Status   06/23/2021 4.35 (H) 0.76 - 1.27 mg/dL Final   06/22/2021 6.49 (H) 0.76 - 1.27 mg/dL Final      Calcium Calcium   Date Value Ref Range Status   06/23/2021 9.1 8.6 - 10.5 mg/dL Final   06/22/2021 11.3 (H) 8.6 - 10.5 mg/dL Final      PO4 No results found for: CAPO4   Albumin Albumin   Date Value Ref Range Status   06/22/2021 5.70 (H) 3.50 - 5.20 g/dL Final      Magnesium Magnesium   Date Value Ref Range Status   06/23/2021 3.1 (H) 1.6 - 2.6 mg/dL Final   06/22/2021 3.1 (H) 1.6 - 2.6 mg/dL Final      Uric Acid No results found for: URICACID        Results Review:     I reviewed the patient's new clinical results.    acetaminophen, 1,000 mg, Oral, Q8H  sodium chloride, 10 mL, Intravenous, Q12H      sodium chloride 0.9 % with KCl 20 mEq, 125 mL/hr, Last Rate: 125 mL/hr (06/23/21 0839)        Medication Review: Reviewed    Assessment/Plan     1) MITZI - Severe pre-renal azotemia, mild rhabdo  2) Volume depletion  3) Emesis  4) Hyponatremia  5) Hypokalemia  6) HTN  7) Marijuana abuse  8) Mild rhabdo  9) Hypercalcemia  10) Lactic acidosis    Plan:  Patient now making urine and Cr improving.  Repeat UA and quantify proteinuria.  Still with significant emesis.  Continue IVF and supportive care.  Replace K.  Will follow.    Vasquez Boucher MD   [facility]  06/23/21  09:27 EDT

## 2021-06-23 NOTE — CONSULTS
Nutrition Services    Patient Name: Trey Ndiaye  YOB: 2000  MRN: [id number]  Admission date: 6/22/2021    *See bottom of note for MSA.     Moderate chronic disease related malnutrition related to insufficient PO intake in the setting of ongoing N/V over the course of months; as evidenced by diet recall consistent with less than 75% estimated needs for at least 1 month; with NFPE revealing moderate and severe muscle loss and moderate fat loss; with greater than 10% weight loss in six months.    - Continue Regular diet as tolerated, which meets needs as long as appetite/intake continue to improve.    - Nutrition education provided today     PPE Documentation        PPE Worn By Provider mask and eye protection; gloves for NFPE   PPE Worn By Patient  None     CLINICAL NUTRITION ASSESSMENT      Reason for Assessment 6/23:  BMI     H&P:  Pt presented to hospital due to unrelenting N/V with inability to tolerate PO.    Past Medical History:   Diagnosis Date   • Anxiety 1/2/2019       No past surgical history on file.     Current Problems:   Nausea/vomiting  -at least some component of cannabis hyperemesis     Renal failure  - nephrology following     Rhabdomyolysis-likely due to severe volume depletion  -IV fluids     Leukocytosis-stress response versus underlying gastritis     Pneumothorax-small, patient hemodynamically stable with no respiratory distress likely due to vomiting     Marijuana use-patient with signs of cannabis hyperemesis     Nutrition/Diet History         Narrative     6/23: Pt assessed due to concern for low BMI. Pt has been having problems with N/V for at least the last five months, per pt report. During this time has lost a significant amount of weight and has had decreased activity tolerance, resulting in more sedentary lifestyle (reports low activity level recently). Explains nausea and vomiting have been major barrier to intake, but appetite is starting to get better today, and he  "has not vomited since 02:00; hopeful he will be able to tolerate dinner. Tray was delivered during visit, and pt trying some sherbet. NFPE completed, which was C/W severe chronic disease related malnutrition. Provided nutrition educ on high kcal high PRO snacks, mini-meals, and smoothies to help with nutritional restoration. RD contact information given for any needed follow up.     Functional Status Independent for ADLs     Food Allergies NKFA     Factors Affecting   Nutritional Intake Nausea, vomiting      Anthropometrics        Current Height, Weight Height: 180.3 cm (71\")  Weight: 58.8 kg (129 lb 10.1 oz) (06/23/21 0500)        Admit Height, Weight -    Flowsheet Rows      First Filed Value   Admission Height  180.3 cm (71\") Documented at 06/22/2021 1023   Admission Weight  49.9 kg (110 lb) Documented at 06/22/2021 1023             Ideal Body Weight (IBW) 172 lb   % Ideal Body Weight 75%       Usual Body Weight UTD   % Usual Body Weight UTD   Wt Change Observation 6/24: Pt with 15.8% weight loss in 6 months   Weight Hx    Wt Readings from Last 30 Encounters:   06/23/21 0500 58.8 kg (129 lb 10.1 oz)   06/22/21 2015 58.2 kg (128 lb 4.9 oz)   06/22/21 1023 49.9 kg (110 lb)   05/07/21 0909 69.6 kg (153 lb 6.4 oz)   04/21/21 1534 64.1 kg (141 lb 5 oz)   04/06/21 1139 67.6 kg (149 lb)   01/23/19 1515 69.9 kg (154 lb)   01/02/19 1424 70.2 kg (154 lb 12.8 oz)   10/07/16 1635 65.9 kg (145 lb 4 oz) (41 %, Z= -0.23)*   09/08/16 1309 65 kg (143 lb 6.1 oz) (38 %, Z= -0.30)*     * Growth percentiles are based on CDC (Boys, 2-20 Years) data.        BMI kg/m2 Body mass index is 18.08 kg/m².       Labs/Medications         Pertinent Labs -   Results from last 7 days   Lab Units 06/23/21  0237 06/22/21  1137   SODIUM mmol/L 131* 127*   POTASSIUM mmol/L 3.3* 3.4*   CHLORIDE mmol/L 85* 70*   CO2 mmol/L 29.0 25.0   BUN mg/dL 88* 75*   CREATININE mg/dL 4.35* 6.49*   CALCIUM mg/dL 9.1 11.3*   BILIRUBIN mg/dL  --  1.4*   ALK PHOS U/L  " --  69   ALT (SGPT) U/L  --  23   AST (SGOT) U/L  --  51*   GLUCOSE mg/dL 113* 144*     Results from last 7 days   Lab Units 06/23/21  0237 06/22/21  1137   MAGNESIUM mg/dL 3.1* 3.1*   PHOSPHORUS mg/dL 6.1*  --    HEMOGLOBIN g/dL 16.8 19.7*   HEMATOCRIT % 47.9 55.6*     COVID19   Date Value Ref Range Status   06/22/2021 Not Detected Not Detected - Ref. Range Final     Lab Results   Component Value Date    HGBA1C 5.2 12/03/2018         Pertinent Medications Reviewed and C/W clinical course; anti-nausea medications in place to help improve PO tolerance      Physical Findings        Overall Physical   Appearance, MSA 6/23: MSA consistent with severe malnutrition (see MSA below)   --  Edema  None documented     Gastrointestinal Nausea; pt reports last BM 6/16 (staff aware), and has not been eating for at least 5 days - may correlate with lack of BM     Tubes No feeding tube      Oral/Mouth Cavity WNL     Skin No breakdown documented      --  Current Nutrition Orders & Evaluation of Intake       Oral Nutrition     Current PO Diet Regular   Supplement None ordered   PO Evaluation     % PO Intake Starting to improve today, 6/23   --  Clinical Course    Nutrition Course Details 6/22: Clear liquids  6/23: Regular     Nutritional Risk Screening        NRS-2002 Score   -       Nutrition Diagnosis         Nutrition Dx Problem 1 Moderate chronic disease related malnutrition related to insufficient PO intake in the setting of ongoing N/V over the course of months; as evidenced by diet recall consistent with less than 75% estimated needs for at least 1 month; with NFPE revealing moderate and severe muscle loss and moderate fat loss; with greater than 10% weight loss in six months.      Nutrition Dx Problem 2 -       Intervention Goal         Intervention Goal(s) Intake  Improving to 75% or better at meals, with good tolerance  Weight stable or increasing     Nutrition Intervention        RD/Tech Action Education provided 6/23 on  high kcal high protein options for weight restoration (including liquids, as these may be more tolerable for pt)      Nutrition Prescription          Diet Prescription Regular   Supplement Prescription None at this time   --  Monitor/Evaluation        Monitor Intake, tolerance, weight changes      Malnutrition Severity Assessment      Patient meets criteria for : Moderate (non-severe) Malnutrition     Malnutrition Type (last 8 hours)      Malnutrition Severity Assessment     Row Name 06/23/21 1621       Malnutrition Severity Assessment    Malnutrition Type  Chronic Disease - Related Malnutrition    Row Name 06/23/21 1621       Insufficient Energy Intake     Insufficient Energy Intake Findings  Severe    Insufficient Energy Intake   <75% of est. energy requirement for > or equal to 3 months    Row Name 06/23/21 1621       Unintentional Weight Loss     Unintentional Weight Loss Findings  Severe    Unintentional Weight Loss   Weight loss of 10% in six months    Row Name 06/23/21 1621       Muscle Loss    Loss of Muscle Mass Findings  Moderate    Temple Region  Severe - deep hollowing/scooping, lack of muscle to touch, facial bones well defined    Clavicle Bone Region  Severe - protruding prominent bone    Acromion Bone Region  Moderate - acromion may slightly protrude    Scapular Bone Region  Moderate - mild depression, bones may show slightly    Dorsal Hand Region  None    Patellar Region  None    Anterior Thigh Region  None    Posterior Calf Region  Moderate - some roundness, slight firmness    Row Name 06/23/21 1621       Fat Loss    Subcutaneous Fat Loss Findings  Moderate    Orbital Region   Moderate -  somewhat hollowness, slightly dark circles    Upper Arm Region  None    Thoracic & Lumbar Region  None    Row Name 06/23/21 1621       Criteria Met (Must meet criteria for severity in at least 2 of these categories: M Wasting, Fat Loss, Fluid, Secondary Signs, Wt. Status, Intake)    Patient meets criteria for    Moderate (non-severe) Malnutrition             Electronically signed by:  Emmy Lynn RD  06/23/21 16:30 EDT

## 2021-06-23 NOTE — PLAN OF CARE
"Goal Outcome Evaluation:  Plan of Care Reviewed With: patient        Progress: improving   Pt is anxious and restless. He complains of continuous nausea and pain \"all over\".    "

## 2021-06-24 VITALS
SYSTOLIC BLOOD PRESSURE: 127 MMHG | HEIGHT: 71 IN | OXYGEN SATURATION: 96 % | TEMPERATURE: 98.9 F | RESPIRATION RATE: 16 BRPM | HEART RATE: 69 BPM | DIASTOLIC BLOOD PRESSURE: 64 MMHG | WEIGHT: 129.63 LBS | BODY MASS INDEX: 18.15 KG/M2

## 2021-06-24 LAB — QT INTERVAL: 351 MS

## 2021-06-24 PROCEDURE — G0378 HOSPITAL OBSERVATION PER HR: HCPCS

## 2021-06-24 NOTE — NURSING NOTE
Pt called myself and also charge nurse Yuridia in room to explain he wanted to leaveAMA. PT also explained he had someone already waiting outside fopr him. This is the same person that was escorted out today following pt being un re[sponisve following receiving the =xanax said person gave him. Pt was explained the details of possible leaving vs the benefits of staying he state dhe understood and released this facility from liability. Pt is A/O times 4 and able to relay wants and needs and take care of himself and make his own decisions

## 2021-06-24 NOTE — CASE MANAGEMENT/SOCIAL WORK
Case Management Discharge Note      Final Note: AMA    Provided Post Acute Provider List?: N/A  Provided Post Acute Provider Quality & Resource List?: N/A    Selected Continued Care - Discharged on 6/24/2021 Admission date: 6/22/2021 - Discharge disposition: Left Against Medical Advice              Final Discharge Disposition Code: 07 - left AMA

## 2021-06-27 LAB
BACTERIA SPEC AEROBE CULT: NORMAL
BACTERIA SPEC AEROBE CULT: NORMAL

## 2021-06-27 NOTE — DISCHARGE SUMMARY
Date of Admission: 6/22/2021    Date of Discharge:  6/27/2021    Length of stay:  LOS: 0 days     Discharge Diagnosis:       Presenting Problem List:    Patient left AGAINST MEDICAL ADVICE.    HPI/Hospital Course:        Procedures Performed         Consults:   Consults     Date and Time Order Name Status Description    6/22/2021 12:20 PM Nephrology (on -call MD unless specified) Completed     6/22/2021 12:20 PM Hospitalist (on-call MD unless specified) Completed           Pertinent Test Results:     Lab Results (last 24 hours)     Procedure Component Value Units Date/Time    Creatinine, Urine, Random - Urine, Clean Catch [[id number]] Collected: 06/23/21 1116    Specimen: Urine, Clean Catch Updated: 06/23/21 1617     Creatinine, Urine 78.4 mg/dL     Narrative:      Reference intervals for random urine have not been established.  Clinical usage is dependent upon physician's interpretation in combination with other laboratory tests.       Protein, Urine, Random - [[id number]] Collected: 06/23/21 1116    Specimen: Urine Updated: 06/23/21 1142     Total Protein, Urine 21.5 mg/dL     Narrative:      Reference intervals for random urine have not been established.  Clinical usage is dependent upon physician's interpretation in combination with other laboratory tests.       Urinalysis, Microscopic Only - Urine, Clean Catch [[id number]]  (Abnormal) Collected: 06/23/21 1116    Specimen: Urine Updated: 06/23/21 1128     RBC, UA 3-5 /HPF      WBC, UA 3-5 /HPF      Bacteria, UA None Seen /HPF      Squamous Epithelial Cells, UA None Seen /HPF      Hyaline Casts, UA None Seen /LPF      Methodology Automated Microscopy    Urinalysis With Microscopic If Indicated (No Culture) - [[id number]]  (Abnormal) Collected: 06/23/21 1116    Specimen: Urine Updated: 06/23/21 1128     Color, UA Yellow     Appearance, UA Clear     pH, UA 6.0     Specific Gravity, UA 1.021     Glucose, UA >=1000 mg/dL (3+)     Ketones, UA Negative     Bilirubin, UA  Negative     Blood, UA Large (3+)     Protein, UA Trace     Leuk Esterase, UA Negative     Nitrite, UA Negative     Urobilinogen, UA 0.2 E.U./dL          Microbiology Results Abnormal     Procedure Component Value - Date/Time    Blood Culture - Blood, Arm, Right [[id number]] Collected: 06/22/21 1345    Lab Status: Final result Specimen: Blood from Arm, Right Updated: 06/27/21 1400     Blood Culture No growth at 5 days    Blood Culture - Blood, Arm, Left [[id number]] Collected: 06/22/21 1320    Lab Status: Final result Specimen: Blood from Arm, Left Updated: 06/27/21 1330     Blood Culture No growth at 5 days    COVID PRE-OP / PRE-PROCEDURE SCREENING ORDER (NO ISOLATION) - Swab, Nasopharynx [[id number]]  (Normal) Collected: 06/22/21 1438    Lab Status: Final result Specimen: Swab from Nasopharynx Updated: 06/22/21 2315    Narrative:      The following orders were created for panel order COVID PRE-OP / PRE-PROCEDURE SCREENING ORDER (NO ISOLATION) - Swab, Nasopharynx.  Procedure                               Abnormality         Status                     ---------                               -----------         ------                     COVID-19,APTIMA PANTHER,...[[id number]]  Normal              Final result                 Please view results for these tests on the individual orders.    COVID-19,APTIMA PANTHER,[facility] IN-HOUSE, NP/OP SWAB IN UTM/VTM/[facility]SPORT MEDIA,24 HR TAT - Swab, Nasopharynx [[id number]]  (Normal) Collected: 06/22/21 1438    Lab Status: Final result Specimen: Swab from Nasopharynx Updated: 06/22/21 2315     COVID19 Not Detected    Narrative:      Fact sheet for providers: https://www.fda.gov/media/991065/download     Fact sheet for patients: https://www.fda.gov/media/837101/download    Test performed by RT PCR.        No radiology results for the last day          Condition on Discharge:    Vital Signs       Physical Exam:        Discharge Disposition  Left Against Medical Advice [7]    Discharge  Medications     Discharge Medications      Patient Not Prescribed Medications Upon Discharge         Discharge Diet:       Activity at Discharge:       Follow-up Appointments  No future appointments.      Test Results Pending at Discharge       Risk for Readmission (LACE) Score: 3 (6/23/2021  6:01 AM)        Bossman Russell MD  06/27/21  14:37 EDT

## 2021-06-29 ENCOUNTER — HOSPITAL ENCOUNTER (EMERGENCY)
Facility: HOSPITAL | Age: 21
Discharge: HOME | End: 2021-06-29
Attending: EMERGENCY MEDICINE | Admitting: EMERGENCY MEDICINE

## 2021-06-29 VITALS
HEART RATE: 73 BPM | OXYGEN SATURATION: 95 % | WEIGHT: 138.67 LBS | DIASTOLIC BLOOD PRESSURE: 72 MMHG | TEMPERATURE: 98.9 F | RESPIRATION RATE: 16 BRPM | BODY MASS INDEX: 19.41 KG/M2 | SYSTOLIC BLOOD PRESSURE: 101 MMHG | HEIGHT: 71 IN

## 2021-06-29 DIAGNOSIS — F19.10 SYNTHETIC CANNABINOID ABUSE (HCC): ICD-10-CM

## 2021-06-29 DIAGNOSIS — R11.15 PERSISTENT VOMITING: Primary | ICD-10-CM

## 2021-06-29 LAB
ANION GAP SERPL CALCULATED.3IONS-SCNC: 11 MMOL/L (ref 5–15)
BASOPHILS # BLD AUTO: 0.1 10*3/MM3 (ref 0–0.2)
BASOPHILS NFR BLD AUTO: 0.6 % (ref 0–1.5)
BUN SERPL-MCNC: 9 MG/DL (ref 6–20)
BUN/CREAT SERPL: 11.5 (ref 7–25)
CALCIUM SPEC-SCNC: 9.5 MG/DL (ref 8.6–10.5)
CHLORIDE SERPL-SCNC: 102 MMOL/L (ref 98–107)
CO2 SERPL-SCNC: 27 MMOL/L (ref 22–29)
CREAT SERPL-MCNC: 0.78 MG/DL (ref 0.76–1.27)
DEPRECATED RDW RBC AUTO: 40.3 FL (ref 37–54)
EOSINOPHIL # BLD AUTO: 0 10*3/MM3 (ref 0–0.4)
EOSINOPHIL NFR BLD AUTO: 0.3 % (ref 0.3–6.2)
ERYTHROCYTE [DISTWIDTH] IN BLOOD BY AUTOMATED COUNT: 12.9 % (ref 12.3–15.4)
GFR SERPL CREATININE-BSD FRML MDRD: 123 ML/MIN/1.73
GLUCOSE SERPL-MCNC: 102 MG/DL (ref 65–99)
HCT VFR BLD AUTO: 42.5 % (ref 37.5–51)
HGB BLD-MCNC: 14.4 G/DL (ref 13–17.7)
HOLD SPECIMEN: NORMAL
LYMPHOCYTES # BLD AUTO: 2.5 10*3/MM3 (ref 0.7–3.1)
LYMPHOCYTES NFR BLD AUTO: 20.5 % (ref 19.6–45.3)
MCH RBC QN AUTO: 30 PG (ref 26.6–33)
MCHC RBC AUTO-ENTMCNC: 33.8 G/DL (ref 31.5–35.7)
MCV RBC AUTO: 88.9 FL (ref 79–97)
MONOCYTES # BLD AUTO: 1 10*3/MM3 (ref 0.1–0.9)
MONOCYTES NFR BLD AUTO: 7.9 % (ref 5–12)
NEUTROPHILS NFR BLD AUTO: 70.7 % (ref 42.7–76)
NEUTROPHILS NFR BLD AUTO: 8.7 10*3/MM3 (ref 1.7–7)
NRBC BLD AUTO-RTO: 0.1 /100 WBC (ref 0–0.2)
PLATELET # BLD AUTO: 311 10*3/MM3 (ref 140–450)
PMV BLD AUTO: 7 FL (ref 6–12)
POTASSIUM SERPL-SCNC: 3.6 MMOL/L (ref 3.5–5.2)
RBC # BLD AUTO: 4.78 10*6/MM3 (ref 4.14–5.8)
SODIUM SERPL-SCNC: 140 MMOL/L (ref 136–145)
WBC # BLD AUTO: 12.3 10*3/MM3 (ref 3.4–10.8)

## 2021-06-29 PROCEDURE — 80048 BASIC METABOLIC PNL TOTAL CA: CPT | Performed by: EMERGENCY MEDICINE

## 2021-06-29 PROCEDURE — 85025 COMPLETE CBC W/AUTO DIFF WBC: CPT | Performed by: EMERGENCY MEDICINE

## 2021-06-29 PROCEDURE — 25010000002 ONDANSETRON PER 1 MG: Performed by: EMERGENCY MEDICINE

## 2021-06-29 PROCEDURE — 96375 TX/PRO/DX INJ NEW DRUG ADDON: CPT

## 2021-06-29 PROCEDURE — 99283 EMERGENCY DEPT VISIT LOW MDM: CPT

## 2021-06-29 PROCEDURE — 25010000002 METOCLOPRAMIDE PER 10 MG: Performed by: EMERGENCY MEDICINE

## 2021-06-29 PROCEDURE — 96374 THER/PROPH/DIAG INJ IV PUSH: CPT

## 2021-06-29 RX ORDER — ONDANSETRON 4 MG/1
4 TABLET, FILM COATED ORAL EVERY 8 HOURS PRN
COMMUNITY

## 2021-06-29 RX ORDER — ALPRAZOLAM 1 MG/1
1 TABLET ORAL 2 TIMES DAILY PRN
COMMUNITY

## 2021-06-29 RX ORDER — PROMETHAZINE HYDROCHLORIDE 25 MG/1
25 TABLET ORAL EVERY 6 HOURS PRN
Qty: 10 TABLET | Refills: 0 | Status: SHIPPED | OUTPATIENT
Start: 2021-06-29

## 2021-06-29 RX ORDER — BUPROPION HYDROCHLORIDE 150 MG/1
TABLET ORAL DAILY
COMMUNITY

## 2021-06-29 RX ORDER — PROMETHAZINE HYDROCHLORIDE 25 MG/1
25 TABLET ORAL EVERY 6 HOURS PRN
COMMUNITY

## 2021-06-29 RX ORDER — SODIUM CHLORIDE 0.9 % (FLUSH) 0.9 %
10 SYRINGE (ML) INJECTION AS NEEDED
Status: DISCONTINUED | OUTPATIENT
Start: 2021-06-29 | End: 2021-06-30 | Stop reason: HOSPADM

## 2021-06-29 RX ORDER — ONDANSETRON 2 MG/ML
4 INJECTION INTRAMUSCULAR; INTRAVENOUS ONCE
Status: COMPLETED | OUTPATIENT
Start: 2021-06-29 | End: 2021-06-29

## 2021-06-29 RX ORDER — METOCLOPRAMIDE HYDROCHLORIDE 5 MG/ML
10 INJECTION INTRAMUSCULAR; INTRAVENOUS ONCE
Status: COMPLETED | OUTPATIENT
Start: 2021-06-29 | End: 2021-06-29

## 2021-06-29 RX ADMIN — SODIUM CHLORIDE 1000 ML: 9 INJECTION, SOLUTION INTRAVENOUS at 21:38

## 2021-06-29 RX ADMIN — ONDANSETRON 4 MG: 2 INJECTION INTRAMUSCULAR; INTRAVENOUS at 21:38

## 2021-06-29 RX ADMIN — METOCLOPRAMIDE 10 MG: 5 INJECTION, SOLUTION INTRAMUSCULAR; INTRAVENOUS at 22:01

## 2021-12-13 ENCOUNTER — HOSPITAL ENCOUNTER (EMERGENCY)
Facility: HOSPITAL | Age: 21
Discharge: HOME | End: 2021-12-13
Attending: EMERGENCY MEDICINE | Admitting: EMERGENCY MEDICINE

## 2021-12-13 VITALS
RESPIRATION RATE: 18 BRPM | HEART RATE: 105 BPM | DIASTOLIC BLOOD PRESSURE: 74 MMHG | WEIGHT: 138 LBS | BODY MASS INDEX: 19.32 KG/M2 | HEIGHT: 71 IN | TEMPERATURE: 99 F | OXYGEN SATURATION: 99 % | SYSTOLIC BLOOD PRESSURE: 129 MMHG

## 2021-12-13 DIAGNOSIS — S61.212A LACERATION OF RIGHT MIDDLE FINGER WITHOUT FOREIGN BODY WITHOUT DAMAGE TO NAIL, INITIAL ENCOUNTER: Primary | ICD-10-CM

## 2021-12-13 PROCEDURE — 99283 EMERGENCY DEPT VISIT LOW MDM: CPT
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
## 2021-12-13 NOTE — DISCHARGE INSTRUCTIONS
See your MD in 10 to 14 days for suture removal.  Keep your wound clean and dry and apply Neosporin once per day.  Follow with your physician earlier for signs of infection

## 2023-05-02 ENCOUNTER — HOSPITAL ENCOUNTER (OUTPATIENT)
Facility: HOSPITAL | Age: 23
Setting detail: OBSERVATION
LOS: 1 days | Discharge: HOME | End: 2023-05-04
Attending: EMERGENCY MEDICINE | Admitting: STUDENT IN AN ORGANIZED HEALTH CARE EDUCATION/TRAINING PROGRAM
Payer: COMMERCIAL

## 2023-05-02 ENCOUNTER — APPOINTMENT (OUTPATIENT)
Dept: CT IMAGING | Facility: HOSPITAL | Age: 23
End: 2023-05-02
Payer: COMMERCIAL

## 2023-05-02 DIAGNOSIS — N28.9 ACUTE RENAL INSUFFICIENCY: Primary | ICD-10-CM

## 2023-05-02 DIAGNOSIS — R11.15 PERSISTENT VOMITING: ICD-10-CM

## 2023-05-02 DIAGNOSIS — F12.90 MARIJUANA USE: ICD-10-CM

## 2023-05-02 LAB
ALBUMIN SERPL-MCNC: 7 G/DL (ref 3.5–5.2)
ALBUMIN/GLOB SERPL: 3.3 G/DL
ALP SERPL-CCNC: 80 U/L (ref 39–117)
ALT SERPL W P-5'-P-CCNC: 19 U/L (ref 1–41)
AMPHET+METHAMPHET UR QL: NEGATIVE
ANION GAP SERPL CALCULATED.3IONS-SCNC: 27 MMOL/L (ref 5–15)
AST SERPL-CCNC: 37 U/L (ref 1–40)
BARBITURATES UR QL SCN: NEGATIVE
BASOPHILS # BLD AUTO: 0.1 10*3/MM3 (ref 0–0.2)
BASOPHILS NFR BLD AUTO: 0.3 % (ref 0–1.5)
BENZODIAZ UR QL SCN: NEGATIVE
BILIRUB SERPL-MCNC: 0.8 MG/DL (ref 0–1.2)
BILIRUB UR QL STRIP: NEGATIVE
BUN SERPL-MCNC: 57 MG/DL (ref 6–20)
BUN/CREAT SERPL: 17.3 (ref 7–25)
CALCIUM SPEC-SCNC: 11 MG/DL (ref 8.6–10.5)
CANNABINOIDS SERPL QL: POSITIVE
CHLORIDE SERPL-SCNC: 83 MMOL/L (ref 98–107)
CLARITY UR: CLEAR
CO2 SERPL-SCNC: 24 MMOL/L (ref 22–29)
COCAINE UR QL: NEGATIVE
COLOR UR: YELLOW
CREAT SERPL-MCNC: 3.29 MG/DL (ref 0.76–1.27)
D-LACTATE SERPL-SCNC: 1.2 MMOL/L (ref 0.5–2)
D-LACTATE SERPL-SCNC: 2.7 MMOL/L (ref 0.3–2)
DEPRECATED RDW RBC AUTO: 42.4 FL (ref 37–54)
EGFRCR SERPLBLD CKD-EPI 2021: 25.7 ML/MIN/1.73
EOSINOPHIL # BLD AUTO: 0.1 10*3/MM3 (ref 0–0.4)
EOSINOPHIL NFR BLD AUTO: 0.4 % (ref 0.3–6.2)
ERYTHROCYTE [DISTWIDTH] IN BLOOD BY AUTOMATED COUNT: 13.3 % (ref 12.3–15.4)
GLOBULIN UR ELPH-MCNC: 2.1 GM/DL
GLUCOSE SERPL-MCNC: 177 MG/DL (ref 65–99)
GLUCOSE UR STRIP-MCNC: NEGATIVE MG/DL
HCT VFR BLD AUTO: 54 % (ref 37.5–51)
HGB BLD-MCNC: 18.5 G/DL (ref 13–17.7)
HGB UR QL STRIP.AUTO: NEGATIVE
KETONES UR QL STRIP: ABNORMAL
LEUKOCYTE ESTERASE UR QL STRIP.AUTO: NEGATIVE
LIPASE SERPL-CCNC: 21 U/L (ref 13–60)
LYMPHOCYTES # BLD AUTO: 1.4 10*3/MM3 (ref 0.7–3.1)
LYMPHOCYTES NFR BLD AUTO: 6.1 % (ref 19.6–45.3)
MCH RBC QN AUTO: 30 PG (ref 26.6–33)
MCHC RBC AUTO-ENTMCNC: 34.2 G/DL (ref 31.5–35.7)
MCV RBC AUTO: 87.7 FL (ref 79–97)
METHADONE UR QL SCN: NEGATIVE
MONOCYTES # BLD AUTO: 2 10*3/MM3 (ref 0.1–0.9)
MONOCYTES NFR BLD AUTO: 8.9 % (ref 5–12)
NEUTROPHILS NFR BLD AUTO: 18.7 10*3/MM3 (ref 1.7–7)
NEUTROPHILS NFR BLD AUTO: 84.3 % (ref 42.7–76)
NITRITE UR QL STRIP: NEGATIVE
NRBC BLD AUTO-RTO: 0.1 /100 WBC (ref 0–0.2)
OPIATES UR QL: NEGATIVE
OXYCODONE UR QL SCN: NEGATIVE
PH UR STRIP.AUTO: <=5 [PH] (ref 5–8)
PLATELET # BLD AUTO: 335 10*3/MM3 (ref 140–450)
PMV BLD AUTO: 8.2 FL (ref 6–12)
POTASSIUM SERPL-SCNC: 3.8 MMOL/L (ref 3.5–5.2)
PROT SERPL-MCNC: 9.1 G/DL (ref 6–8.5)
PROT UR QL STRIP: ABNORMAL
RBC # BLD AUTO: 6.16 10*6/MM3 (ref 4.14–5.8)
SODIUM SERPL-SCNC: 134 MMOL/L (ref 136–145)
SP GR UR STRIP: 1.02 (ref 1–1.03)
UROBILINOGEN UR QL STRIP: ABNORMAL
WBC NRBC COR # BLD: 22.2 10*3/MM3 (ref 3.4–10.8)

## 2023-05-02 PROCEDURE — 80307 DRUG TEST PRSMV CHEM ANLYZR: CPT | Performed by: EMERGENCY MEDICINE

## 2023-05-02 PROCEDURE — 87040 BLOOD CULTURE FOR BACTERIA: CPT

## 2023-05-02 PROCEDURE — 74176 CT ABD & PELVIS W/O CONTRAST: CPT

## 2023-05-02 PROCEDURE — 36415 COLL VENOUS BLD VENIPUNCTURE: CPT

## 2023-05-02 PROCEDURE — 96375 TX/PRO/DX INJ NEW DRUG ADDON: CPT

## 2023-05-02 PROCEDURE — G0378 HOSPITAL OBSERVATION PER HR: HCPCS

## 2023-05-02 PROCEDURE — 96376 TX/PRO/DX INJ SAME DRUG ADON: CPT

## 2023-05-02 PROCEDURE — 81003 URINALYSIS AUTO W/O SCOPE: CPT

## 2023-05-02 PROCEDURE — 80053 COMPREHEN METABOLIC PANEL: CPT

## 2023-05-02 PROCEDURE — 83690 ASSAY OF LIPASE: CPT

## 2023-05-02 PROCEDURE — 99284 EMERGENCY DEPT VISIT MOD MDM: CPT

## 2023-05-02 PROCEDURE — 99285 EMERGENCY DEPT VISIT HI MDM: CPT

## 2023-05-02 PROCEDURE — 85025 COMPLETE CBC W/AUTO DIFF WBC: CPT

## 2023-05-02 PROCEDURE — 25010000002 ONDANSETRON PER 1 MG: Performed by: NURSE PRACTITIONER

## 2023-05-02 PROCEDURE — 83605 ASSAY OF LACTIC ACID: CPT

## 2023-05-02 PROCEDURE — 25010000002 DROPERIDOL PER 5 MG

## 2023-05-02 PROCEDURE — 25010000002 LORAZEPAM PER 2 MG: Performed by: STUDENT IN AN ORGANIZED HEALTH CARE EDUCATION/TRAINING PROGRAM

## 2023-05-02 PROCEDURE — 25010000002 METOCLOPRAMIDE PER 10 MG: Performed by: NURSE PRACTITIONER

## 2023-05-02 PROCEDURE — 87086 URINE CULTURE/COLONY COUNT: CPT | Performed by: EMERGENCY MEDICINE

## 2023-05-02 PROCEDURE — 25010000002 CEFTRIAXONE PER 250 MG

## 2023-05-02 PROCEDURE — 96361 HYDRATE IV INFUSION ADD-ON: CPT

## 2023-05-02 PROCEDURE — 96365 THER/PROPH/DIAG IV INF INIT: CPT

## 2023-05-02 PROCEDURE — 96367 TX/PROPH/DG ADDL SEQ IV INF: CPT

## 2023-05-02 RX ORDER — SODIUM CHLORIDE 0.9 % (FLUSH) 0.9 %
10 SYRINGE (ML) INJECTION AS NEEDED
Status: DISCONTINUED | OUTPATIENT
Start: 2023-05-02 | End: 2023-05-04 | Stop reason: HOSPADM

## 2023-05-02 RX ORDER — SODIUM CHLORIDE 9 MG/ML
125 INJECTION, SOLUTION INTRAVENOUS CONTINUOUS
Status: DISCONTINUED | OUTPATIENT
Start: 2023-05-02 | End: 2023-05-04 | Stop reason: HOSPADM

## 2023-05-02 RX ORDER — ONDANSETRON 2 MG/ML
4 INJECTION INTRAMUSCULAR; INTRAVENOUS EVERY 6 HOURS
Status: DISCONTINUED | OUTPATIENT
Start: 2023-05-02 | End: 2023-05-04 | Stop reason: HOSPADM

## 2023-05-02 RX ORDER — SODIUM CHLORIDE 0.9 % (FLUSH) 0.9 %
10 SYRINGE (ML) INJECTION EVERY 12 HOURS SCHEDULED
Status: DISCONTINUED | OUTPATIENT
Start: 2023-05-02 | End: 2023-05-04 | Stop reason: HOSPADM

## 2023-05-02 RX ORDER — HEPARIN SODIUM 5000 [USP'U]/ML
5000 INJECTION, SOLUTION INTRAVENOUS; SUBCUTANEOUS EVERY 12 HOURS SCHEDULED
Status: DISCONTINUED | OUTPATIENT
Start: 2023-05-02 | End: 2023-05-04 | Stop reason: HOSPADM

## 2023-05-02 RX ORDER — METOCLOPRAMIDE HYDROCHLORIDE 5 MG/ML
10 INJECTION INTRAMUSCULAR; INTRAVENOUS EVERY 6 HOURS
Status: DISCONTINUED | OUTPATIENT
Start: 2023-05-02 | End: 2023-05-04 | Stop reason: HOSPADM

## 2023-05-02 RX ORDER — SODIUM CHLORIDE 9 MG/ML
40 INJECTION, SOLUTION INTRAVENOUS AS NEEDED
Status: DISCONTINUED | OUTPATIENT
Start: 2023-05-02 | End: 2023-05-04 | Stop reason: HOSPADM

## 2023-05-02 RX ORDER — NICOTINE 21 MG/24HR
1 PATCH, TRANSDERMAL 24 HOURS TRANSDERMAL EVERY 24 HOURS
Status: DISCONTINUED | OUTPATIENT
Start: 2023-05-02 | End: 2023-05-02

## 2023-05-02 RX ORDER — ACETAMINOPHEN 325 MG/1
650 TABLET ORAL EVERY 4 HOURS PRN
Status: DISCONTINUED | OUTPATIENT
Start: 2023-05-02 | End: 2023-05-04 | Stop reason: HOSPADM

## 2023-05-02 RX ORDER — CHOLECALCIFEROL (VITAMIN D3) 125 MCG
5 CAPSULE ORAL NIGHTLY PRN
Status: DISCONTINUED | OUTPATIENT
Start: 2023-05-02 | End: 2023-05-04 | Stop reason: HOSPADM

## 2023-05-02 RX ORDER — FAMOTIDINE 20 MG/1
40 TABLET, FILM COATED ORAL DAILY
Status: DISCONTINUED | OUTPATIENT
Start: 2023-05-02 | End: 2023-05-04 | Stop reason: HOSPADM

## 2023-05-02 RX ORDER — NICOTINE 21 MG/24HR
1 PATCH, TRANSDERMAL 24 HOURS TRANSDERMAL
Status: DISCONTINUED | OUTPATIENT
Start: 2023-05-02 | End: 2023-05-04 | Stop reason: HOSPADM

## 2023-05-02 RX ORDER — CALCIUM CARBONATE 200(500)MG
1 TABLET,CHEWABLE ORAL 2 TIMES DAILY PRN
Status: DISCONTINUED | OUTPATIENT
Start: 2023-05-02 | End: 2023-05-04 | Stop reason: HOSPADM

## 2023-05-02 RX ORDER — LORAZEPAM 2 MG/ML
1 INJECTION INTRAMUSCULAR ONCE
Status: COMPLETED | OUTPATIENT
Start: 2023-05-02 | End: 2023-05-02

## 2023-05-02 RX ORDER — ONDANSETRON 4 MG/1
4 TABLET, FILM COATED ORAL EVERY 6 HOURS PRN
Status: DISCONTINUED | OUTPATIENT
Start: 2023-05-02 | End: 2023-05-04 | Stop reason: HOSPADM

## 2023-05-02 RX ORDER — DROPERIDOL 2.5 MG/ML
1.25 INJECTION, SOLUTION INTRAMUSCULAR; INTRAVENOUS ONCE
Status: COMPLETED | OUTPATIENT
Start: 2023-05-02 | End: 2023-05-02

## 2023-05-02 RX ORDER — METRONIDAZOLE 500 MG/1
500 TABLET ORAL EVERY 8 HOURS SCHEDULED
Status: DISCONTINUED | OUTPATIENT
Start: 2023-05-02 | End: 2023-05-03

## 2023-05-02 RX ORDER — LORAZEPAM 2 MG/ML
1 INJECTION INTRAMUSCULAR EVERY 4 HOURS PRN
Status: DISCONTINUED | OUTPATIENT
Start: 2023-05-02 | End: 2023-05-02

## 2023-05-02 RX ORDER — METRONIDAZOLE 500 MG/100ML
500 INJECTION, SOLUTION INTRAVENOUS ONCE
Status: COMPLETED | OUTPATIENT
Start: 2023-05-02 | End: 2023-05-02

## 2023-05-02 RX ADMIN — DROPERIDOL 1.25 MG: 2.5 INJECTION, SOLUTION INTRAMUSCULAR; INTRAVENOUS at 08:17

## 2023-05-02 RX ADMIN — SODIUM CHLORIDE 1000 ML: 9 INJECTION, SOLUTION INTRAVENOUS at 08:17

## 2023-05-02 RX ADMIN — FAMOTIDINE 40 MG: 20 TABLET ORAL at 14:49

## 2023-05-02 RX ADMIN — NICOTINE 1 PATCH: 21 PATCH, EXTENDED RELEASE TRANSDERMAL at 12:04

## 2023-05-02 RX ADMIN — METOCLOPRAMIDE HYDROCHLORIDE 10 MG: 5 INJECTION INTRAMUSCULAR; INTRAVENOUS at 16:52

## 2023-05-02 RX ADMIN — ONDANSETRON 4 MG: 2 INJECTION INTRAMUSCULAR; INTRAVENOUS at 18:01

## 2023-05-02 RX ADMIN — Medication 10 ML: at 20:41

## 2023-05-02 RX ADMIN — LORAZEPAM 1 MG: 2 INJECTION INTRAMUSCULAR; INTRAVENOUS at 18:01

## 2023-05-02 RX ADMIN — SODIUM CHLORIDE 1884 ML: 9 INJECTION, SOLUTION INTRAVENOUS at 09:22

## 2023-05-02 RX ADMIN — METRONIDAZOLE 500 MG: 500 INJECTION, SOLUTION INTRAVENOUS at 10:14

## 2023-05-02 RX ADMIN — METRONIDAZOLE 500 MG: 500 TABLET ORAL at 18:01

## 2023-05-02 RX ADMIN — Medication 10 ML: at 16:52

## 2023-05-02 RX ADMIN — SODIUM CHLORIDE 125 ML/HR: 900 INJECTION INTRAVENOUS at 23:57

## 2023-05-02 RX ADMIN — SODIUM CHLORIDE 125 ML/HR: 900 INJECTION INTRAVENOUS at 14:49

## 2023-05-02 RX ADMIN — CEFTRIAXONE 2 G: 2 INJECTION, POWDER, FOR SOLUTION INTRAMUSCULAR; INTRAVENOUS at 09:22

## 2023-05-02 RX ADMIN — METOCLOPRAMIDE HYDROCHLORIDE 10 MG: 5 INJECTION INTRAMUSCULAR; INTRAVENOUS at 20:42

## 2023-05-02 NOTE — ED NOTES
Pt. States unable to give urine sample at this time. Have asked 3 times. Told him would give him couple more minutes then try straight cath

## 2023-05-02 NOTE — H&P
[facility]   HISTORY AND PHYSICAL    Patient Name: Trey Ndiaye  : 2000  MRN: [id number]  Primary Care Physician:  Radha Gudino, JAM  Date of admission: 2023    Subjective   Subjective     Chief Complaint: Nausea, vomiting, and abdominal pain    HPI: Patient is a 23-year-old male who presents to ED for evaluation of nausea, vomiting, and abdominal pain.  Patient with past medical history of cannabinoid hyperemesis syndrome, GERD, controlled substance abuse, and active tobacco abuse.  Patient states has been puking for the last 4 days where he could not keep down any food or drinks.  Admits, has been vomiting frequent times during the day.  Last meal was 5 days ago reportedly.  Also patient complains of epigastric abdominal pain that has been associated with nausea and vomiting.  Patient reports being diagnosed with cannabinoid hyperemesis syndrome 2 years ago, where he was evaluated by GI and recommended to quit smoking.  However patient still smoking marijuana and cigarettes.  Patient admits to opioid abuse 3 to 4 years ago but denies any current opioid abuse.  Patient admits to start using some over-the-counter energy supplements for 4 months.  However he quit using it 2 months ago without any issues.  Recently he got a  5-month age which has been a bit stressful for him.  Patient states that he would spent 70% of his time during the last 4 days in a hot shower to alleviate his abdominal pain.  Describing it as constant pain, does not radiate, /10.  Patient used to have 2 bowel movements per day.  However, last bowel movement was 5 days ago per patient.  In ED, work-up reveals WBC of 22.2, lactic acid 2.7, creatinine 3.29.  CT abdomen without contrast was unremarkable.  Patient denies any shortness of breath, chest pain, cough, dysuria, constipation or diarrhea.  Patient is full code.    Review of Systems   Gastrointestinal: Positive for abdominal pain, nausea and vomiting.   All  other systems reviewed and are negative.       Personal History     Past Medical History:   Diagnosis Date   • Anxiety 1/2/2019   • Cannabis hyperemesis syndrome concurrent with and due to cannabis abuse        No past surgical history on file.    Family History: family history includes Breast cancer in his paternal grandmother; No Known Problems in his father, maternal grandfather, maternal grandmother, mother, and paternal grandfather. Otherwise pertinent FHx was reviewed and not pertinent to current issue.    Social History:  reports that he has been smoking. He has never used smokeless tobacco. He reports current alcohol use. He reports current drug use. Drug: Marijuana.    Home Medications:   will be reconciled once available    Allergies:  No Known Allergies    Objective    Objective     Vitals:   Temp:  [95.8 °F (35.4 °C)] 95.8 °F (35.4 °C)  Heart Rate:  [] 86  Resp:  [20] 20  BP: (102-132)/() 122/63    Physical Exam  Vitals and nursing note reviewed.   Constitutional:       General: He is not in acute distress.  HENT:      Head: Normocephalic and atraumatic.      Mouth/Throat:      Mouth: Mucous membranes are dry.   Cardiovascular:      Rate and Rhythm: Tachycardia present.      Heart sounds: No murmur heard.  Pulmonary:      Effort: Pulmonary effort is normal.      Breath sounds: Normal breath sounds.   Abdominal:      General: There is no distension.      Palpations: Abdomen is soft.      Tenderness: There is abdominal tenderness.   Musculoskeletal:         General: Normal range of motion.      Cervical back: Normal range of motion.   Neurological:      Mental Status: He is alert and oriented to person, place, and time.   Psychiatric:         Mood and Affect: Mood normal.         Result Review    Result Review:  I have personally reviewed the results from the time of this admission to 5/2/2023 12:17 EDT and agree with these findings:  [x]  Laboratory list / accordion  []  Microbiology  [x]   Radiology  []  EKG/Telemetry   []  Cardiology/Vascular   []  Pathology  []  Old records  [x]  Other:  Most notable findings include: WBC 22.2, creatinine 3.29      Assessment & Plan   Assessment / Plan     Brief Patient Summary:  Trey Ndiaye is a 23 y.o. male who presents with nausea, vomiting, and abdominal pain that has been going on for 4 days.  Has been smoking weeds for many years.  History of cannabinoid hyperemesis syndrome. MITZI.    Active Hospital Problems:  Active Hospital Problems    Diagnosis    • **Acute renal insufficiency      Plan:     Sepsis  Sepsis protocol initiated  IV fluids per sepsis protocol given  IV Rocephin and p.o. Flagyl  Blood cultures obtained  UA will be obtained  Telemetry for close monitoring  Routine labs ordered    Nausea vomiting abdominal pain   with history of cannabinoid hyperemesis syndrome  IV antiemetics as needed  IV fluids  CT abdomen without contrast unremarkable  GI consulted    Lactic acidosis  Lactate 2.7, will be trended    Acute kidney injury  will hydrate gently and monitor closely  Creatinine 3.29  Likely prerenal  Consider nephrology consult if no improvement     Hyponatremia  IV normal saline  Sodium 134, will monitor and repeat    Dehydration  IV fluids    Hemoconcentration  Likely due to dehydration    History of drug abuse  Current use of marijuana  opioid abuse 3 to 4 years ago, denies any current abuse  will monitor for any withdrawal symptoms  UA and screening pending    GERD  Pepcid    Tobacco abuse  Nicotine patch  Counseled on smoking cessation    DVT prophylaxis  Heparin subcu    Patient has been discussed with ED physician.  All labs, and imaging studies reviewed, care plan discussed with patient..    DVT prophylaxis:  No DVT prophylaxis order currently exists.    CODE STATUS:    Level Of Support Discussed With: Patient  Code Status (Patient has no pulse and is not breathing): CPR (Attempt to Resuscitate)  Medical Interventions (Patient has pulse or  is breathing): Full Support    Admission Status:  I believe this patient meets inpatient status.    Foreign Owens MD

## 2023-05-02 NOTE — ED NOTES
"Pt. States \"would rather die then let you cath me\" asked patient if he feels like hurting himself he states no. Just feeling alone. Educated patient we cannot treat you properly if you cant provide us a urine sample. Also educated on high creatine lab and how important it is see what urine shows.   "

## 2023-05-02 NOTE — ED NOTES
Pt. Reusing to give us urine sample. Refusing catheter. Have tried to educate patient. Going to continue to try

## 2023-05-02 NOTE — PLAN OF CARE
Goal Outcome Evaluation:   Patient currently being evaluated and treated,  patient has no complaints at this time.  Will monitor.

## 2023-05-02 NOTE — ED PROVIDER NOTES
Subjective   History of Present Illness  Chief Complaint: Abdominal pain, nausea vomiting      HPI: Patient is a 23-year-old male who presents to the emergency room by private vehicle, reports a history of CHS, daily marijuana use.  States over the last 3 to 4 days he has had generalized abdominal pain with persistent nausea and vomiting, states that hot showers will alleviate him pain will return.  He does not have a gastroenterologist states he has been educated on his THC use and persistent abdominal pain with nausea and vomiting as he has been admitted for this in the past.  He has a primary care but cannot remember her name. No other attempted treatment prior to arrival.     PCP: Shahab    History provided by:  Patient      Review of Systems   Gastrointestinal: Positive for abdominal pain, nausea and vomiting.       Past Medical History:   Diagnosis Date   • Anxiety 1/2/2019   • Cannabis hyperemesis syndrome concurrent with and due to cannabis abuse        No Known Allergies    No past surgical history on file.    Family History   Problem Relation Age of Onset   • No Known Problems Mother    • No Known Problems Father    • No Known Problems Maternal Grandmother    • No Known Problems Maternal Grandfather    • Breast cancer Paternal Grandmother    • No Known Problems Paternal Grandfather        Social History     Socioeconomic History   • Marital status: Single   Tobacco Use   • Smoking status: Every Day   • Smokeless tobacco: Never   Vaping Use   • Vaping Use: Every day   • Substances: Nicotine, THC, Flavoring   • Devices: Disposable, Pre-filled pod   • Passive vaping exposure: Yes   Substance and Sexual Activity   • Alcohol use: Yes     Comment: occ   • Drug use: Yes     Types: Marijuana     Comment: 7 days a week since he was 12.   • Sexual activity: Yes           Objective   Physical Exam  Vitals reviewed.   Constitutional:       Appearance: He is ill-appearing.   HENT:      Head: Normocephalic.      Nose:  Nose normal.      Mouth/Throat:      Mouth: Mucous membranes are dry.   Eyes:      Extraocular Movements: Extraocular movements intact.      Pupils: Pupils are equal, round, and reactive to light.   Cardiovascular:      Rate and Rhythm: Regular rhythm. Tachycardia present.      Pulses: Normal pulses.      Heart sounds: Normal heart sounds. No murmur heard.  Pulmonary:      Effort: Pulmonary effort is normal.      Breath sounds: Normal breath sounds. No wheezing.   Abdominal:      General: Bowel sounds are normal.      Palpations: Abdomen is soft.      Tenderness: There is abdominal tenderness.   Musculoskeletal:         General: Normal range of motion.      Cervical back: Neck supple. No rigidity.   Skin:     General: Skin is warm and dry.      Capillary Refill: Capillary refill takes less than 2 seconds.   Neurological:      General: No focal deficit present.      Mental Status: He is alert and oriented to person, place, and time. Mental status is at baseline.      Motor: No weakness.         Procedures           ED Course  ED Course as of 05/02/23 1555   Tue May 02, 2023   0825 WBC(!): 22.20 [BH]   0851 Labs indicate acute renal insufficiency, CT of the abdomen pelvis was changed without, lactic acid elevated at 2.730 mL/kg bolus ordered as well as abdominal source antibiotics per sepsis protocol. [BH]   0851 Creatinine(!): 3.29 [BH]   0851 BUN(!): 57 [BH]   0946 Patient reportedly refusing to produce urine specimen.   [BH]   1117 Awaiting return call from hospitalist, patient continues to refuse urinalysis   [BH]   1122 As I continue to await the return call from the hospitalist patient has now removed the IV by himself stating that he will likely be leaving AGAINST MEDICAL ADVICE.  I am awaiting him to come out of the restroom to have a discussion with the patient regarding admission. [BH]   1125 After patient returned to his ED evaluation room he states that the IV came unscrewed and it is still in place he  "does not want to leave AGAINST MEDICAL ADVICE he went to the restroom and smoked a cigarette.  Nicotine patch has been ordered.  Discussed ED findings with the patient and plan for admission, he gave verbal understanding and was agreeable. []   8527 Dr. Owens came to the ED, discussed the patients care and plan for admission.  []      ED Course User Index  [] Angélica Mendoza APRN           /69 (BP Location: Right arm, Patient Position: Lying)   Pulse 72   Temp 98.3 °F (36.8 °C) (Oral)   Resp 18   Ht 180.3 cm (71\")   Wt 64.2 kg (141 lb 8.6 oz)   SpO2 91%   BMI 19.74 kg/m²   Labs Reviewed   COMPREHENSIVE METABOLIC PANEL - Abnormal; Notable for the following components:       Result Value    Glucose 177 (*)     BUN 57 (*)     Creatinine 3.29 (*)     Sodium 134 (*)     Chloride 83 (*)     Calcium 11.0 (*)     Total Protein 9.1 (*)     Albumin 7.0 (*)     Anion Gap 27.0 (*)     eGFR 25.7 (*)     All other components within normal limits    Narrative:     GFR Normal >60  Chronic Kidney Disease <60  Kidney Failure <15     URINALYSIS W/ MICROSCOPIC IF INDICATED (NO CULTURE) - Abnormal; Notable for the following components:    Ketones, UA Trace (*)     Protein, UA Trace (*)     All other components within normal limits    Narrative:     Urine microscopic not indicated.   CBC WITH AUTO DIFFERENTIAL - Abnormal; Notable for the following components:    WBC 22.20 (*)     RBC 6.16 (*)     Hemoglobin 18.5 (*)     Hematocrit 54.0 (*)     Neutrophil % 84.3 (*)     Lymphocyte % 6.1 (*)     Neutrophils, Absolute 18.70 (*)     Monocytes, Absolute 2.00 (*)     All other components within normal limits   URINE DRUG SCREEN - Abnormal; Notable for the following components:    THC, Screen, Urine Positive (*)     All other components within normal limits    Narrative:     Negative Thresholds Per Drugs Screened:    Amphetamines                 500 ng/ml  Barbiturates                 200 ng/ml  Benzodiazepines             "  100 ng/ml  Cocaine                      300 ng/ml  Methadone                    300 ng/ml  Opiates                      300 ng/ml  Oxycodone                    100 ng/ml  THC                           50 ng/ml    The Normal Value for all drugs tested is negative. This report includes final unconfirmed screening results to be used for medical treatment purposes only. Unconfirmed results must not be used for non-medical purposes such as employment or legal testing. Clinical consideration should be applied to any drug of abuse test, particularly when unconfirmed results are used.          All urine drugs of abuse requests without chain of custody are for medical screening purposes only.  False positives are possible.     POC LACTATE - Abnormal; Notable for the following components:    Lactate 2.7 (*)     All other components within normal limits   LIPASE - Normal   LACTIC ACID, REFLEX - Normal   BLOOD CULTURE   BLOOD CULTURE   URINE CULTURE   POC LACTATE   CBC AND DIFFERENTIAL    Narrative:     The following orders were created for panel order CBC & Differential.  Procedure                               Abnormality         Status                     ---------                               -----------         ------                     CBC Auto Differential[[id number]]        Abnormal            Final result                 Please view results for these tests on the individual orders.     Medications   sodium chloride 0.9 % flush 10 mL (has no administration in time range)   nicotine (NICODERM CQ) 21 MG/24HR patch 1 patch (1 patch Transdermal Medication Applied 5/2/23 1204)   sodium chloride 0.9 % flush 10 mL (has no administration in time range)   sodium chloride 0.9 % flush 10 mL (has no administration in time range)   sodium chloride 0.9 % infusion 40 mL (has no administration in time range)   heparin (porcine) 5000 UNIT/ML injection 5,000 Units (5,000 Units Subcutaneous Not Given 5/2/23 9427)   sodium chloride 0.9 %  infusion (125 mL/hr Intravenous New Bag 5/2/23 1449)   acetaminophen (TYLENOL) tablet 650 mg (has no administration in time range)   melatonin tablet 5 mg (has no administration in time range)   ondansetron (ZOFRAN) tablet 4 mg (has no administration in time range)   famotidine (PEPCID) tablet 40 mg (40 mg Oral Given 5/2/23 1449)   calcium carbonate (TUMS) chewable tablet 500 mg (200 mg elemental) (has no administration in time range)   cefTRIAXone (ROCEPHIN) 2 g in sodium chloride 0.9 % 100 mL IVPB (has no administration in time range)   metroNIDAZOLE (FLAGYL) tablet 500 mg (has no administration in time range)   metoclopramide (REGLAN) injection 10 mg (has no administration in time range)   ondansetron (ZOFRAN) injection 4 mg (has no administration in time range)   sodium chloride 0.9 % bolus 1,000 mL (0 mL Intravenous Stopped 5/2/23 0953)   droperidol (INAPSINE) injection 1.25 mg (1.25 mg Intravenous Given 5/2/23 0817)   sodium chloride 0.9 % bolus 1,884 mL (0 mL Intravenous Stopped 5/2/23 1156)   cefTRIAXone (ROCEPHIN) 2 g in sodium chloride 0.9 % 100 mL IVPB (0 g Intravenous Stopped 5/2/23 1014)   metroNIDAZOLE (FLAGYL) IVPB 500 mg (0 mg Intravenous Stopped 5/2/23 1156)     CT Abdomen Pelvis Without Contrast    Result Date: 5/2/2023  Impression: No acute intra-abdominal pathology. Electronically Signed: Sekou Greenfield  5/2/2023 9:13 AM EDT  Workstation ID: [id number]                                    Medical Decision Making  SEPTIC SHOCK FOCUSED EXAM ATTESTATION    I attest that I have reassessed tissue perfusion after the fluid bolus given.    JAM Lund  05/02/23  15:55 EDT    As patient presented to the emergency room with generalized abdominal pain and persistent nausea vomiting he endorses history of daily marijuana use, as well as requiring admission related to dehydration in the past.  He does not have establish care with a gastroenterologist.  An IV was established and he was given a fluid  bolus, he was tachypneic and tachycardic with abdominal pain triggering hospital sepsis protocol White blood cell count elevated at 22,000 as well as a lactic of 2.7, blood cultures added and pending.  He was treated abdominal source with antibiotics, Rocephin recommended by pharmacy.  He was given a dose of droperidol which has improved his symptoms significantly.  Unfortunately labs noted BUN of 57 and a creatinine of 3.29 which indicates acute renal insufficiency, he does have a noted history of acute kidney injury in the past, unable to complete a CT with contrast so it was done without and noted no intra-abdominal infections noted.  Patient case discussed with Dr. Owens, with the hospitalist group who agreed that patient would benefit from admission and continued monitoring.  Patient was updated and gave verbal understanding, he was agitated and restless, nicotine patch was ordered per patient request.      Chart review: 6/22/2021-Admission related to cyclic hyperemesis, thc use, dehydration      This document is intended for medical expert use only. Reading of this document by patients and/or patient's family without participating medical staff guidance may result in misinterpretation and unintended morbidity. Any interpretation of such data is the responsibility of the patient and/or family member responsible for the patient in concert with their primary or specialist providers, not to be left for sources of online searches such as [employer], [employer] or similar queries. Relying on these approaches to knowledge may result in misinterpretation, misguided goals of care and even death should patients or family members try recommendations outside of the realm of professional medical care in a supervised inpatient environment.     Appropriate PPE worn during exam.    Acute renal insufficiency: complicated acute illness or injury  Marijuana use: complicated acute illness or injury  Persistent vomiting: complicated acute  illness or injury  Amount and/or Complexity of Data Reviewed  External Data Reviewed: labs and notes.  Labs: ordered. Decision-making details documented in ED Course.  Radiology: ordered and independent interpretation performed. Decision-making details documented in ED Course.      Risk  OTC drugs.  Prescription drug management.  Decision regarding hospitalization.          Final diagnoses:   Acute renal insufficiency   Persistent vomiting   Marijuana use       ED Disposition  ED Disposition     ED Disposition   Decision to Admit    Condition   --    Comment   Level of Care: Telemetry [5]   Diagnosis: Acute renal insufficiency [943774]   Admitting Physician: NATALY MILLS [[id number]]   Attending Physician: NATALY MILLS [[id number]]   Certification: I Certify That Inpatient Hospital Services Are Medically Necessary For Greater Than 2 Midnights               No follow-up provider specified.       Medication List      No changes were made to your prescriptions during this visit.          Angélica Mendoza, JAM  05/02/23 1555       Angélica Mendoza, JAM  05/02/23 1552

## 2023-05-02 NOTE — Clinical Note
Level of Care: Med/Surg [1]   Admitting Physician: NATALY MILLS [[id number]]   Attending Physician: NATALY MILLS [[id number]]

## 2023-05-02 NOTE — CONSULTS
GI CONSULT  NOTE:    Referring Provider:  Dr. Owens    Chief complaint: Nausea/vomiting, abdominal pain    Subjective .     History of present illness: Trey Ndiaye is a 23 y.o. male with history of cannabis hyperemesis syndrome and substance abuse who presents with complaints of nausea/vomiting and abdominal pain.  The patient has a longstanding history of marijuana use and has been diagnosed in the past with cannabis hyperemesis syndrome.  He states that he has continued to smoke marijuana, but is serious about quitting this time as he now has a 5-month-old daughter.  He has been having epigastric abdominal pain, but pain has significantly improved since admission.  Reports that his last episode of nausea/vomiting was approximately 24 hours ago.  He states that he did drink some water and was able to keep this down without difficulty.  No heartburn or dysphagia.  Reports regular bowel movements without bright red blood per rectum or melena.      Endo History:  No record of previous endoscopy.  However, patient reports previous EGD years ago.    Past Medical History:  Past Medical History:   Diagnosis Date   • Anxiety 1/2/2019   • Cannabis hyperemesis syndrome concurrent with and due to cannabis abuse        Past Surgical History:  No past surgical history on file.    Social History:  Social History     Tobacco Use   • Smoking status: Every Day   • Smokeless tobacco: Never   Vaping Use   • Vaping Use: Every day   • Substances: Nicotine, THC, Flavoring   • Devices: Disposable, Pre-filled pod   • Passive vaping exposure: Yes   Substance Use Topics   • Alcohol use: Yes     Comment: occ   • Drug use: Yes     Types: Marijuana     Comment: 7 days a week since he was 12.       Family History:  Family History   Problem Relation Age of Onset   • No Known Problems Mother    • No Known Problems Father    • No Known Problems Maternal Grandmother    • No Known Problems Maternal Grandfather    • Breast cancer Paternal  "Grandmother    • No Known Problems Paternal Grandfather        Medications:  No medications prior to admission.       Scheduled Meds:[START ON 5/3/2023] cefTRIAXone, 2 g, Intravenous, Q24H  famotidine, 40 mg, Oral, Daily  heparin (porcine), 5,000 Units, Subcutaneous, Q12H  metroNIDAZOLE, 500 mg, Oral, Q8H  nicotine, 1 patch, Transdermal, Q24H  sodium chloride, 10 mL, Intravenous, Q12H      Continuous Infusions:sodium chloride, 100 mL/hr      PRN Meds:.•  acetaminophen  •  calcium carbonate  •  melatonin  •  ondansetron  •  [COMPLETED] Insert Peripheral IV **AND** sodium chloride  •  sodium chloride  •  sodium chloride    ALLERGIES:  Patient has no known allergies.    ROS:  The following systems were reviewed and negative;   Constitution:  No fevers, chills, no unintentional weight loss  Skin: no rash, no jaundice  Eyes:  No blurry vision, no eye pain  HENT:  No change in hearing or smell  Resp:  No dyspnea or cough  CV:  No chest pain or palpitations  :  No dysuria, hematuria  Musculoskeletal:  No leg cramps or arthralgias  Neuro:  No tremor, no numbness  Psych:  No depression or confusion    Objective     Vital Signs:   Vitals:    05/02/23 1031 05/02/23 1101 05/02/23 1216 05/02/23 1309   BP: 122/73 113/69 122/63 119/69   BP Location:    Right arm   Patient Position:    Lying   Pulse: 90 75 86 72   Resp:    18   Temp:    98.3 °F (36.8 °C)   TempSrc:    Oral   SpO2: 93% 94% 95% 91%   Weight:    64.2 kg (141 lb 8.6 oz)   Height:    180.3 cm (71\")       Physical Exam:       General Appearance:    Awake and alert, in no acute distress   Head:    Normocephalic, without obvious abnormality, atraumatic   Throat:   No oral lesions, no thrush, oral mucosa moist   Lungs:     Respirations regular, even and unlabored   Chest Wall:    No abnormalities observed   Abdomen:     Soft, mild epigastric tenderness, no rebound or guarding, non-distended   Rectal:     Deferred   Extremities:   Moves all extremities, no edema, no " cyanosis   Pulses:   Pulses palpable and equal bilaterally   Skin:   No rash, no jaundice, normal palpation   Lymph nodes:   No cervical, supraclavicular or submandibular palpable adenopathy   Neurologic:   Cranial nerves 2 - 12 grossly intact, no asterixis       Results Review:   I reviewed the patient's labs and imaging.  CBC    Results from last 7 days   Lab Units 05/02/23  0812   WBC 10*3/mm3 22.20*   HEMOGLOBIN g/dL 18.5*   PLATELETS 10*3/mm3 335     CMP   Results from last 7 days   Lab Units 05/02/23  0812   SODIUM mmol/L 134*   POTASSIUM mmol/L 3.8   CHLORIDE mmol/L 83*   CO2 mmol/L 24.0   BUN mg/dL 57*   CREATININE mg/dL 3.29*   GLUCOSE mg/dL 177*   ALBUMIN g/dL 7.0*   BILIRUBIN mg/dL 0.8   ALK PHOS U/L 80   AST (SGOT) U/L 37   ALT (SGPT) U/L 19   LIPASE U/L 21     Cr Clearance Estimated Creatinine Clearance: 31.2 mL/min (A) (by C-G formula based on SCr of 3.29 mg/dL (H)).  Coag     HbA1C   Lab Results   Component Value Date    HGBA1C 5.2 12/03/2018     Blood Glucose No results found for: POCGLU  Infection     UA    Results from last 7 days   Lab Units 05/02/23  1201   NITRITE UA  Negative     Radiology(recent) CT Abdomen Pelvis Without Contrast    Result Date: 5/2/2023  Impression: No acute intra-abdominal pathology. Electronically Signed: Sekou Greenfield  5/2/2023 9:13 AM EDT  Workstation ID: [id number]         ASSESSMENT:  -Nausea/vomiting  -Epigastric pain  -Cannabis hyperemesis syndrome  -MITZI  -Leukocytosis  -History of substance abuse    PLAN:  Patient is a pleasant 23-year-old male with history of cannabis hyperemesis syndrome who presents with complaints of abdominal pain and nausea/vomiting x4 days.  Patient was found to be in acute renal failure with creatinine of 3.29 on admission.    Start IVF's.  Plan alternating Reglan and Zofran.  Clear liquid diet.  CT abdomen/pelvis does not show any acute abnormality.  Complete marijuana cessation recommended.  Likely home tomorrow from GI standpoint.      I  discussed the patients findings and my recommendations with the patient.  I will discuss case Dr. Saavedra and change the plan accordingly.    We appreciate the referral.    Electronically signed by JAM Barrett, 05/02/23, 2:32 PM EDT.

## 2023-05-03 LAB
ALBUMIN SERPL-MCNC: 4.4 G/DL (ref 3.5–5.2)
ALBUMIN/GLOB SERPL: 1.8 G/DL
ALP SERPL-CCNC: 66 U/L (ref 39–117)
ALT SERPL W P-5'-P-CCNC: 15 U/L (ref 1–41)
ANION GAP SERPL CALCULATED.3IONS-SCNC: 10 MMOL/L (ref 5–15)
AST SERPL-CCNC: 36 U/L (ref 1–40)
BACTERIA SPEC AEROBE CULT: NO GROWTH
BASOPHILS # BLD AUTO: 0 10*3/MM3 (ref 0–0.2)
BASOPHILS NFR BLD AUTO: 0.2 % (ref 0–1.5)
BILIRUB SERPL-MCNC: 0.6 MG/DL (ref 0–1.2)
BUN SERPL-MCNC: 21 MG/DL (ref 6–20)
BUN/CREAT SERPL: 23.9 (ref 7–25)
CALCIUM SPEC-SCNC: 9.4 MG/DL (ref 8.6–10.5)
CHLORIDE SERPL-SCNC: 100 MMOL/L (ref 98–107)
CO2 SERPL-SCNC: 29 MMOL/L (ref 22–29)
CREAT SERPL-MCNC: 0.88 MG/DL (ref 0.76–1.27)
D-LACTATE SERPL-SCNC: 1.3 MMOL/L (ref 0.5–2)
DEPRECATED RDW RBC AUTO: 42.9 FL (ref 37–54)
EGFRCR SERPLBLD CKD-EPI 2021: 122.4 ML/MIN/1.73
EOSINOPHIL # BLD AUTO: 0.1 10*3/MM3 (ref 0–0.4)
EOSINOPHIL NFR BLD AUTO: 0.6 % (ref 0.3–6.2)
ERYTHROCYTE [DISTWIDTH] IN BLOOD BY AUTOMATED COUNT: 13.2 % (ref 12.3–15.4)
GLOBULIN UR ELPH-MCNC: 2.4 GM/DL
GLUCOSE SERPL-MCNC: 83 MG/DL (ref 65–99)
HCT VFR BLD AUTO: 43.4 % (ref 37.5–51)
HGB BLD-MCNC: 14.6 G/DL (ref 13–17.7)
LYMPHOCYTES # BLD AUTO: 2.5 10*3/MM3 (ref 0.7–3.1)
LYMPHOCYTES NFR BLD AUTO: 18.5 % (ref 19.6–45.3)
MCH RBC QN AUTO: 30.3 PG (ref 26.6–33)
MCHC RBC AUTO-ENTMCNC: 33.6 G/DL (ref 31.5–35.7)
MCV RBC AUTO: 90 FL (ref 79–97)
MONOCYTES # BLD AUTO: 1.6 10*3/MM3 (ref 0.1–0.9)
MONOCYTES NFR BLD AUTO: 12 % (ref 5–12)
NEUTROPHILS NFR BLD AUTO: 68.7 % (ref 42.7–76)
NEUTROPHILS NFR BLD AUTO: 9.3 10*3/MM3 (ref 1.7–7)
NRBC BLD AUTO-RTO: 0.4 /100 WBC (ref 0–0.2)
PLATELET # BLD AUTO: 221 10*3/MM3 (ref 140–450)
PMV BLD AUTO: 9.1 FL (ref 6–12)
POTASSIUM SERPL-SCNC: 4.1 MMOL/L (ref 3.5–5.2)
PROT SERPL-MCNC: 6.8 G/DL (ref 6–8.5)
RBC # BLD AUTO: 4.82 10*6/MM3 (ref 4.14–5.8)
SODIUM SERPL-SCNC: 139 MMOL/L (ref 136–145)
TSH SERPL DL<=0.05 MIU/L-ACNC: 0.32 UIU/ML (ref 0.27–4.2)
WBC NRBC COR # BLD: 13.5 10*3/MM3 (ref 3.4–10.8)

## 2023-05-03 PROCEDURE — 25010000002 LORAZEPAM PER 2 MG: Performed by: STUDENT IN AN ORGANIZED HEALTH CARE EDUCATION/TRAINING PROGRAM

## 2023-05-03 PROCEDURE — 25010000002 METOCLOPRAMIDE PER 10 MG: Performed by: NURSE PRACTITIONER

## 2023-05-03 PROCEDURE — 96376 TX/PRO/DX INJ SAME DRUG ADON: CPT

## 2023-05-03 PROCEDURE — 25010000002 ONDANSETRON PER 1 MG: Performed by: NURSE PRACTITIONER

## 2023-05-03 PROCEDURE — G0378 HOSPITAL OBSERVATION PER HR: HCPCS

## 2023-05-03 PROCEDURE — 25010000002 CEFTRIAXONE PER 250 MG: Performed by: STUDENT IN AN ORGANIZED HEALTH CARE EDUCATION/TRAINING PROGRAM

## 2023-05-03 PROCEDURE — 80050 GENERAL HEALTH PANEL: CPT | Performed by: NURSE PRACTITIONER

## 2023-05-03 PROCEDURE — 96361 HYDRATE IV INFUSION ADD-ON: CPT

## 2023-05-03 PROCEDURE — 0 HYDROXYZINE PER 25 MG: Performed by: STUDENT IN AN ORGANIZED HEALTH CARE EDUCATION/TRAINING PROGRAM

## 2023-05-03 PROCEDURE — 96372 THER/PROPH/DIAG INJ SC/IM: CPT

## 2023-05-03 PROCEDURE — 83605 ASSAY OF LACTIC ACID: CPT | Performed by: STUDENT IN AN ORGANIZED HEALTH CARE EDUCATION/TRAINING PROGRAM

## 2023-05-03 RX ORDER — BUSPIRONE HYDROCHLORIDE 5 MG/1
5 TABLET ORAL 3 TIMES DAILY
Status: DISCONTINUED | OUTPATIENT
Start: 2023-05-03 | End: 2023-05-03

## 2023-05-03 RX ORDER — BUSPIRONE HYDROCHLORIDE 5 MG/1
5 TABLET ORAL EVERY 12 HOURS SCHEDULED
Status: DISCONTINUED | OUTPATIENT
Start: 2023-05-03 | End: 2023-05-04 | Stop reason: HOSPADM

## 2023-05-03 RX ORDER — HYDROXYZINE HYDROCHLORIDE 25 MG/1
25 TABLET, FILM COATED ORAL ONCE
Status: COMPLETED | OUTPATIENT
Start: 2023-05-03 | End: 2023-05-03

## 2023-05-03 RX ORDER — AMITRIPTYLINE HYDROCHLORIDE 50 MG/1
50 TABLET, FILM COATED ORAL NIGHTLY
Status: DISCONTINUED | OUTPATIENT
Start: 2023-05-03 | End: 2023-05-04 | Stop reason: HOSPADM

## 2023-05-03 RX ORDER — HYDROXYZINE HYDROCHLORIDE 50 MG/ML
50 INJECTION, SOLUTION INTRAMUSCULAR EVERY 6 HOURS PRN
Status: DISCONTINUED | OUTPATIENT
Start: 2023-05-03 | End: 2023-05-04

## 2023-05-03 RX ORDER — LORAZEPAM 2 MG/ML
0.5 INJECTION INTRAMUSCULAR ONCE
Status: COMPLETED | OUTPATIENT
Start: 2023-05-03 | End: 2023-05-03

## 2023-05-03 RX ADMIN — HYDROXYZINE HYDROCHLORIDE 25 MG: 25 TABLET, FILM COATED ORAL at 01:47

## 2023-05-03 RX ADMIN — METOCLOPRAMIDE HYDROCHLORIDE 10 MG: 5 INJECTION INTRAMUSCULAR; INTRAVENOUS at 21:15

## 2023-05-03 RX ADMIN — ACETAMINOPHEN 650 MG: 325 TABLET, FILM COATED ORAL at 07:43

## 2023-05-03 RX ADMIN — CEFTRIAXONE 2 G: 2 INJECTION, POWDER, FOR SOLUTION INTRAMUSCULAR; INTRAVENOUS at 00:00

## 2023-05-03 RX ADMIN — Medication 10 ML: at 09:08

## 2023-05-03 RX ADMIN — BUSPIRONE HYDROCHLORIDE 5 MG: 5 TABLET ORAL at 21:15

## 2023-05-03 RX ADMIN — ONDANSETRON 4 MG: 2 INJECTION INTRAMUSCULAR; INTRAVENOUS at 17:34

## 2023-05-03 RX ADMIN — METOCLOPRAMIDE HYDROCHLORIDE 10 MG: 5 INJECTION INTRAMUSCULAR; INTRAVENOUS at 16:45

## 2023-05-03 RX ADMIN — Medication 10 ML: at 21:16

## 2023-05-03 RX ADMIN — ONDANSETRON 4 MG: 2 INJECTION INTRAMUSCULAR; INTRAVENOUS at 00:00

## 2023-05-03 RX ADMIN — FAMOTIDINE 40 MG: 20 TABLET ORAL at 17:22

## 2023-05-03 RX ADMIN — NICOTINE 1 PATCH: 21 PATCH, EXTENDED RELEASE TRANSDERMAL at 09:05

## 2023-05-03 RX ADMIN — ONDANSETRON 4 MG: 2 INJECTION INTRAMUSCULAR; INTRAVENOUS at 12:05

## 2023-05-03 RX ADMIN — ONDANSETRON 4 MG: 2 INJECTION INTRAMUSCULAR; INTRAVENOUS at 06:23

## 2023-05-03 RX ADMIN — METRONIDAZOLE 500 MG: 500 TABLET ORAL at 06:23

## 2023-05-03 RX ADMIN — HYDROXYZINE HYDROCHLORIDE 50 MG: 50 INJECTION, SOLUTION INTRAMUSCULAR at 17:34

## 2023-05-03 RX ADMIN — BUSPIRONE HYDROCHLORIDE 5 MG: 5 TABLET ORAL at 12:05

## 2023-05-03 RX ADMIN — METOCLOPRAMIDE HYDROCHLORIDE 10 MG: 5 INJECTION INTRAMUSCULAR; INTRAVENOUS at 09:04

## 2023-05-03 RX ADMIN — AMITRIPTYLINE HYDROCHLORIDE 50 MG: 50 TABLET, FILM COATED ORAL at 21:15

## 2023-05-03 RX ADMIN — ONDANSETRON 4 MG: 2 INJECTION INTRAMUSCULAR; INTRAVENOUS at 21:15

## 2023-05-03 RX ADMIN — METOCLOPRAMIDE HYDROCHLORIDE 10 MG: 5 INJECTION INTRAMUSCULAR; INTRAVENOUS at 04:29

## 2023-05-03 RX ADMIN — Medication 5 MG: at 21:15

## 2023-05-03 RX ADMIN — SODIUM CHLORIDE 125 ML/HR: 900 INJECTION INTRAVENOUS at 10:14

## 2023-05-03 RX ADMIN — LORAZEPAM 0.5 MG: 2 INJECTION INTRAMUSCULAR; INTRAVENOUS at 12:12

## 2023-05-03 NOTE — CASE MANAGEMENT/SOCIAL WORK
Discharge Planning Assessment   Louie     Patient Name: Trey Ndiaye  MRN: [id number]  Today's Date: 5/3/2023    Admit Date: 5/2/2023    Plan: D/C plan: Anticipate home/self care.   Discharge Needs Assessment     Row Name 05/03/23 1406       Living Environment    Quality of Family Relationships unable to assess       Discharge Needs Assessment    Readmission Within the Last 30 Days no previous admission in last 30 days    Concerns to be Addressed substance/tobacco abuse/use;discharge planning    Provided Post Acute Provider List? N/A    Current Discharge Risk substance use/abuse               Discharge Plan     Row Name 05/03/23 1407       Plan    Plan D/C plan: Anticipate home/self care.    Patient/Family in Agreement with Plan unable to assess    Plan Comments CM attempted to see pt at bedside for CM assessment. Pt lying in bed with sheet over head and refused to speak with CM. Unable to assess any needs for d/c. CM was advised by MD of social concerns regarding marijuana use. CM consulted with CHEMO Stauffer and requested to see pt.                    Demographic Summary     Row Name 05/03/23 1406       General Information    Admission Type observation    Arrived From emergency department    Referral Source admission list    Reason for Consult discharge planning    Preferred Language English                                     Functional Status     Row Name 05/03/23 1406       Functional Status, IADL    Medications independent    Meal Preparation independent    Housekeeping independent    Laundry independent    Shopping independent                     Met with patient in room     Maintained distance greater than six feet and spent less than 15 minutes in the room.          Rad Ferreira RN

## 2023-05-03 NOTE — PLAN OF CARE
"Goal Outcome Evaluation:              Outcome Evaluation: Patient currently sitting on side of bed. Patient has appeared anxious throught the shift and as the night progressed it appears to have gotten worse. Patient has been walking in hallway and around in his room. MD notified and new order obtained. Patient currently appears very anxious and having slight tremors. Patient stating it is because he is here and he wants to smoke and thats why he leaves AMA every time. This writer attempted to talk with him about how he feels and asked if patient if he smokes to help with anxiety, he stated that he did. Patient also stated that he didn't like the meds, and that he is looking for a different doctor. This writer started to speak more with him about ways to help with anxiety. We spoke about things he enjoyed and his daughter. Patient was polite then began cursing and stating \"you need to stop acting like you care\", \"the f*cking meds are a joke\" and then \"get the f*uck out of his room\". This writer stated that I was sorry he felt that way and that if he needed anything to hit his call light that I would leave him alone.  "

## 2023-05-03 NOTE — PROGRESS NOTES
LOS: 1 day   Patient Care Team:  Radha Gudino APRN as PCP - General (Family Medicine)      Subjective     Interval History:     Subjective: Patient reports that he had recurrence of nausea/vomiting following clear liquid tray this morning.  No hematemesis.  Continues to have some mild epigastric pain.      ROS:   No chest pain, shortness of breath, or cough.        Medication Review:     Current Facility-Administered Medications:   •  acetaminophen (TYLENOL) tablet 650 mg, 650 mg, Oral, Q4H PRN, Zeke Owens MD, 650 mg at 05/03/23 0743  •  busPIRone (BUSPAR) tablet 5 mg, 5 mg, Oral, Q12H, Zeke Owens MD  •  calcium carbonate (TUMS) chewable tablet 500 mg (200 mg elemental), 1 tablet, Oral, BID PRN, Zeke Owens MD  •  cefTRIAXone (ROCEPHIN) 2 g in sodium chloride 0.9 % 100 mL IVPB, 2 g, Intravenous, Q24H, Zeke Owens MD, Last Rate: 200 mL/hr at 05/03/23 0000, 2 g at 05/03/23 0000  •  famotidine (PEPCID) tablet 40 mg, 40 mg, Oral, Daily, Zeke Owens MD, 40 mg at 05/02/23 1449  •  heparin (porcine) 5000 UNIT/ML injection 5,000 Units, 5,000 Units, Subcutaneous, Q12H, Zeke Owens MD  •  hydrOXYzine (VISTARIL) injection 50 mg, 50 mg, Intramuscular, Q6H PRN, Zeke Owens MD  •  melatonin tablet 5 mg, 5 mg, Oral, Nightly PRN, Zeke Oewns MD  •  metoclopramide (REGLAN) injection 10 mg, 10 mg, Intravenous, Q6H, Velma Melo APRN, 10 mg at 05/03/23 0904  •  metroNIDAZOLE (FLAGYL) tablet 500 mg, 500 mg, Oral, Q8H, Zeke Owens MD, 500 mg at 05/03/23 0623  •  nicotine (NICODERM CQ) 21 MG/24HR patch 1 patch, 1 patch, Transdermal, Q24H, Angélica Mendoza APRN, 1 patch at 05/03/23 0905  •  ondansetron (ZOFRAN) injection 4 mg, 4 mg, Intravenous, Q6H, Velma Melo APRN, 4 mg at 05/03/23 0623  •  ondansetron (ZOFRAN) tablet 4 mg, 4 mg, Oral, Q6H PRN, Zeke Owens MD  •  [COMPLETED] Insert Peripheral IV, , , Once **AND** sodium chloride 0.9 % flush 10 mL, 10 mL, Intravenous, PRN, Reji  JAM Alexander  •  sodium chloride 0.9 % flush 10 mL, 10 mL, Intravenous, Q12H, Zeke Owens MD, 10 mL at 05/03/23 0908  •  sodium chloride 0.9 % flush 10 mL, 10 mL, Intravenous, PRN, Zeke Owens MD  •  sodium chloride 0.9 % infusion 40 mL, 40 mL, Intravenous, PRN, Zeke Owens MD  •  sodium chloride 0.9 % infusion, 125 mL/hr, Intravenous, Continuous, Velma Melo APRN, Last Rate: 125 mL/hr at 05/03/23 1014, 125 mL/hr at 05/03/23 1014      Objective     Vital Signs  Vitals:    05/02/23 1802 05/03/23 0035 05/03/23 0420 05/03/23 0740   BP: 125/79 116/54 109/69 125/71   BP Location: Right arm Right arm Right arm    Patient Position: Lying Lying Lying    Pulse: 81 80 63 71   Resp: 12 16 16 18   Temp: 98.3 °F (36.8 °C)  98.4 °F (36.9 °C) 98.5 °F (36.9 °C)   TempSrc: Oral  Oral    SpO2: 92% 98% 98% 97%   Weight:       Height:           Physical Exam:     General Appearance:    Awake and alert, in no acute distress   Head:    Normocephalic, without obvious abnormality   Eyes:          Conjunctivae normal, anicteric sclera   Throat:   No oral lesions, no thrush, oral mucosa moist   Neck:   No adenopathy, supple, no JVD   Lungs:     respirations regular, even and unlabored   Abdomen:     Soft, epigastric tenderness, no rebound or guarding, non-distended   Rectal:     Deferred   Extremities:   No edema, no cyanosis   Skin:   No bruising or rash, no jaundice        Results Review:    CBC    Results from last 7 days   Lab Units 05/03/23  0428 05/02/23  0812   WBC 10*3/mm3 13.50* 22.20*   HEMOGLOBIN g/dL 14.6 18.5*   PLATELETS 10*3/mm3 221 335     CMP   Results from last 7 days   Lab Units 05/03/23  0428 05/02/23  0812   SODIUM mmol/L 139 134*   POTASSIUM mmol/L 4.1 3.8   CHLORIDE mmol/L 100 83*   CO2 mmol/L 29.0 24.0   BUN mg/dL 21* 57*   CREATININE mg/dL 0.88 3.29*   GLUCOSE mg/dL 83 177*   ALBUMIN g/dL 4.4 7.0*   BILIRUBIN mg/dL 0.6 0.8   ALK PHOS U/L 66 80   AST (SGOT) U/L 36 37   ALT (SGPT) U/L 15 19   LIPASE  U/L  --  21     Cr Clearance Estimated Creatinine Clearance: 116.5 mL/min (by C-G formula based on SCr of 0.88 mg/dL).  Coag     HbA1C   Lab Results   Component Value Date    HGBA1C 5.2 12/03/2018     Blood Glucose No results found for: POCGLU  Infection   Results from last 7 days   Lab Units 05/02/23  0916 05/02/23  0846   BLOODCX  No growth at 24 hours No growth at 24 hours     UA    Results from last 7 days   Lab Units 05/02/23  1201   NITRITE UA  Negative     Radiology(recent) CT Abdomen Pelvis Without Contrast    Result Date: 5/2/2023  Impression: No acute intra-abdominal pathology. Electronically Signed: Sekou Greenfield  5/2/2023 9:13 AM EDT  Workstation ID: [id number]         Assessment & Plan     ASSESSMENT:  -Nausea/vomiting  -Epigastric pain  -Cannabis hyperemesis syndrome  -MITZI  -Leukocytosis  -History of substance abuse     PLAN:  Patient is a pleasant 23-year-old male with history of cannabis hyperemesis syndrome who presents with complaints of abdominal pain and nausea/vomiting x4 days.  Patient was found to be in acute renal failure with creatinine of 3.29 on admission.    Patient reports a recurrence of nausea/vomiting this morning after attempting to eat a clear liquid tray.  Continue alternating Reglan and Zofran.  We will add amitriptyline.  Continue clears as tolerated.  CT abd/pelvis WO not show any acute abnormality.  Complete marijuana cessation recommended.  Continue supportive care.      Electronically signed by JAM Barrett, 05/03/23, 10:47 AM EDT.

## 2023-05-03 NOTE — PROGRESS NOTES
[facility]     Progress Note    Patient Name: Trey Ndiaye  : 2000  MRN: [id number]  Primary Care Physician:  Radha Gudino, JAM  Date of admission: 2023    Subjective   Subjective     Chief Complaint: N/V    Patient tried some clear liquid diet yesterday but was not well-tolerated.  Has been anxious since he has not smoked weeks during hospitalization reportedly.  Started vomiting again this morning.    Review of Systems   Nausea/vomiting    Objective   Objective     Vitals:   Temp:  [98.3 °F (36.8 °C)-98.5 °F (36.9 °C)] 98.5 °F (36.9 °C)  Heart Rate:  [63-81] 65  Resp:  [12-18] 12  BP: (109-132)/(54-79) 132/66    Physical Exam    Vitals and nursing note reviewed.   Constitutional:       General: He is not in acute distress.  HENT:      Head: Normocephalic and atraumatic.      Mouth/Throat:     Cardiovascular:         Heart sounds: No murmur heard.  Pulmonary:      Effort: Pulmonary effort is normal.      Breath sounds: Normal breath sounds.   Abdominal:      General: There is no distension.      Palpations: Abdomen is soft.      Tenderness: There is abdominal tenderness.   Musculoskeletal:         General: Normal range of motion.      Cervical back: Normal range of motion.   Neurological:      Mental Status: He is alert and oriented to person, place, and time.   Psychiatric:         Mood and Affect: Mood normal.        Result Review    Result Review:  I have personally reviewed the results from the time of this admission to 5/3/2023 14:15 EDT and agree with these findings:  [x]  Laboratory list / accordion  [x]  Microbiology  []  Radiology  []  EKG/Telemetry   []  Cardiology/Vascular   []  Pathology  []  Old records  []  Other:        Assessment & Plan   Assessment / Plan     Brief Patient Summary:  Trey Ndiaye is a 23 y.o. male     Active Hospital Problems:  Active Hospital Problems    Diagnosis    • **Acute renal insufficiency      Plan:     Nausea vomiting abdominal pain   with  history of cannabinoid hyperemesis syndrome  IV antiemetics as needed  IV fluids  CT abdomen without contrast unremarkable  GI consulted and following.  Alternate Zofran with Reglan    Anxiety  Likely marijuana withdrawal related  Amitriptyline started by GI  Penny added  Vistaril as needed    Sepsis  Labs improved drastically, DC antibiotics  Blood cultures obtained     Lactic acidosis  resolved     Acute kidney injury  resolved     Hyponatremia  Resolved     Dehydration  Resolved  Continue supportive care     Hemoconcentration  Resolved     History of drug abuse  Current use of marijuana  opioid abuse 3 to 4 years ago, denies any current abuse  will monitor for any withdrawal symptoms  UA and screening reviewed     GERD  Pepcid     Tobacco abuse  Nicotine patch  Counseled on smoking cessation     DVT prophylaxis  Heparin subcu    DVT prophylaxis:  Medical DVT prophylaxis orders are present.    CODE STATUS:    Level Of Support Discussed With: Patient  Code Status (Patient has no pulse and is not breathing): CPR (Attempt to Resuscitate)  Medical Interventions (Patient has pulse or is breathing): Full Support    Disposition:  I expect patient to be discharged home 1 to 2 days once medically stable and vomiting resolved.  Discussed with case management.    Foreign Owens MD

## 2023-05-03 NOTE — PLAN OF CARE
Goal Outcome Evaluation:      Patient has had multiple episodes of paranoiac episodes.  He pulled out his IV, IV replaced.  He is not combative but is having distressful episodes.   He gets tearful, yet apologetic.  Will monitor patient.

## 2023-05-03 NOTE — CASE MANAGEMENT/SOCIAL WORK
"Social Work Assessment  [facility]     Patient Name: Trey Ndiaye  MRN: [id number]  Today's Date: 5/3/2023    Admit Date: 5/2/2023     Demographic Summary     Row Name 05/03/23 1139       General Information    Reason for Consult substance use concerns;mental health concerns    General Information Comments SW was consulted re: marijuana dependence, used to \"calm down.\" SW met with pt in room 302 to inquire further and offer resources. Pt lying in bed with sheet over face, of which was not removed during interaction. SW introduced self and reason for consult. Pt stated he smokes marijuana daily, multiple times per day, having started at 12 y.o. Pt asked this writer, \"what's the end game?\" SW explained that his mental health could be helped by a professional which would help him to quit marijuana, reportedly what is exacerbating pt's medical issues. Pt interrupted this writer during explanation, stating, \"I don't need it.\" SW offered CD resources, to which pt repeated, \"I don't need it.\" It was clear pt was not interested in continuing the conversation so SW excused self.              CHIARA Del Cid, [facility]  Medical Social Worker  Ph [phone]  Fax [phone]  [email or web address]    "

## 2023-05-04 ENCOUNTER — READMISSION MANAGEMENT (OUTPATIENT)
Dept: CALL CENTER | Facility: HOSPITAL | Age: 23
End: 2023-05-04
Payer: COMMERCIAL

## 2023-05-04 VITALS
WEIGHT: 144.4 LBS | OXYGEN SATURATION: 91 % | BODY MASS INDEX: 20.22 KG/M2 | TEMPERATURE: 98.9 F | HEIGHT: 71 IN | DIASTOLIC BLOOD PRESSURE: 65 MMHG | HEART RATE: 62 BPM | SYSTOLIC BLOOD PRESSURE: 118 MMHG | RESPIRATION RATE: 18 BRPM

## 2023-05-04 LAB
ANION GAP SERPL CALCULATED.3IONS-SCNC: 11 MMOL/L (ref 5–15)
BASOPHILS # BLD AUTO: 0.1 10*3/MM3 (ref 0–0.2)
BASOPHILS NFR BLD AUTO: 0.8 % (ref 0–1.5)
BUN SERPL-MCNC: 13 MG/DL (ref 6–20)
BUN/CREAT SERPL: 18.6 (ref 7–25)
CALCIUM SPEC-SCNC: 9.4 MG/DL (ref 8.6–10.5)
CHLORIDE SERPL-SCNC: 107 MMOL/L (ref 98–107)
CO2 SERPL-SCNC: 22 MMOL/L (ref 22–29)
CREAT SERPL-MCNC: 0.7 MG/DL (ref 0.76–1.27)
DEPRECATED RDW RBC AUTO: 42.9 FL (ref 37–54)
EGFRCR SERPLBLD CKD-EPI 2021: 131.1 ML/MIN/1.73
EOSINOPHIL # BLD AUTO: 0.2 10*3/MM3 (ref 0–0.4)
EOSINOPHIL NFR BLD AUTO: 1.9 % (ref 0.3–6.2)
ERYTHROCYTE [DISTWIDTH] IN BLOOD BY AUTOMATED COUNT: 13.2 % (ref 12.3–15.4)
GLUCOSE SERPL-MCNC: 131 MG/DL (ref 65–99)
HCT VFR BLD AUTO: 39.3 % (ref 37.5–51)
HGB BLD-MCNC: 13.2 G/DL (ref 13–17.7)
LYMPHOCYTES # BLD AUTO: 1.7 10*3/MM3 (ref 0.7–3.1)
LYMPHOCYTES NFR BLD AUTO: 14.9 % (ref 19.6–45.3)
MCH RBC QN AUTO: 30.3 PG (ref 26.6–33)
MCHC RBC AUTO-ENTMCNC: 33.6 G/DL (ref 31.5–35.7)
MCV RBC AUTO: 90.1 FL (ref 79–97)
MONOCYTES # BLD AUTO: 1.3 10*3/MM3 (ref 0.1–0.9)
MONOCYTES NFR BLD AUTO: 11.6 % (ref 5–12)
NEUTROPHILS NFR BLD AUTO: 70.8 % (ref 42.7–76)
NEUTROPHILS NFR BLD AUTO: 8.2 10*3/MM3 (ref 1.7–7)
NRBC BLD AUTO-RTO: 0.1 /100 WBC (ref 0–0.2)
PLATELET # BLD AUTO: 183 10*3/MM3 (ref 140–450)
PMV BLD AUTO: 8.5 FL (ref 6–12)
POTASSIUM SERPL-SCNC: 4 MMOL/L (ref 3.5–5.2)
RBC # BLD AUTO: 4.36 10*6/MM3 (ref 4.14–5.8)
SODIUM SERPL-SCNC: 140 MMOL/L (ref 136–145)
WBC NRBC COR # BLD: 11.5 10*3/MM3 (ref 3.4–10.8)

## 2023-05-04 PROCEDURE — 25010000002 ONDANSETRON PER 1 MG: Performed by: NURSE PRACTITIONER

## 2023-05-04 PROCEDURE — G0378 HOSPITAL OBSERVATION PER HR: HCPCS

## 2023-05-04 PROCEDURE — 25010000002 METOCLOPRAMIDE PER 10 MG: Performed by: NURSE PRACTITIONER

## 2023-05-04 PROCEDURE — 96376 TX/PRO/DX INJ SAME DRUG ADON: CPT

## 2023-05-04 PROCEDURE — 80048 BASIC METABOLIC PNL TOTAL CA: CPT | Performed by: STUDENT IN AN ORGANIZED HEALTH CARE EDUCATION/TRAINING PROGRAM

## 2023-05-04 PROCEDURE — 25010000002 LORAZEPAM PER 2 MG: Performed by: STUDENT IN AN ORGANIZED HEALTH CARE EDUCATION/TRAINING PROGRAM

## 2023-05-04 PROCEDURE — 85025 COMPLETE CBC W/AUTO DIFF WBC: CPT | Performed by: STUDENT IN AN ORGANIZED HEALTH CARE EDUCATION/TRAINING PROGRAM

## 2023-05-04 RX ORDER — BUSPIRONE HYDROCHLORIDE 5 MG/1
5 TABLET ORAL EVERY 12 HOURS SCHEDULED
Qty: 30 TABLET | Refills: 0 | Status: SHIPPED | OUTPATIENT
Start: 2023-05-04 | End: 2023-05-04 | Stop reason: SDUPTHER

## 2023-05-04 RX ORDER — ONDANSETRON 4 MG/1
4 TABLET, FILM COATED ORAL EVERY 6 HOURS PRN
Qty: 40 TABLET | Refills: 0 | Status: SHIPPED | OUTPATIENT
Start: 2023-05-04

## 2023-05-04 RX ORDER — BUSPIRONE HYDROCHLORIDE 5 MG/1
5 TABLET ORAL EVERY 12 HOURS SCHEDULED
Qty: 30 TABLET | Refills: 0 | Status: SHIPPED | OUTPATIENT
Start: 2023-05-04

## 2023-05-04 RX ORDER — LORAZEPAM 2 MG/ML
0.5 INJECTION INTRAMUSCULAR ONCE
Status: COMPLETED | OUTPATIENT
Start: 2023-05-04 | End: 2023-05-04

## 2023-05-04 RX ORDER — AMITRIPTYLINE HYDROCHLORIDE 50 MG/1
50 TABLET, FILM COATED ORAL NIGHTLY
Qty: 30 TABLET | Refills: 0 | Status: SHIPPED | OUTPATIENT
Start: 2023-05-04

## 2023-05-04 RX ORDER — AMITRIPTYLINE HYDROCHLORIDE 50 MG/1
50 TABLET, FILM COATED ORAL NIGHTLY
Qty: 30 TABLET | Refills: 0 | Status: SHIPPED | OUTPATIENT
Start: 2023-05-04 | End: 2023-05-04 | Stop reason: SDUPTHER

## 2023-05-04 RX ORDER — HYDROXYZINE HYDROCHLORIDE 25 MG/1
50 TABLET, FILM COATED ORAL 3 TIMES DAILY PRN
Status: DISCONTINUED | OUTPATIENT
Start: 2023-05-04 | End: 2023-05-04 | Stop reason: HOSPADM

## 2023-05-04 RX ORDER — HYDROXYZINE 50 MG/1
50 TABLET, FILM COATED ORAL 3 TIMES DAILY PRN
Qty: 30 TABLET | Refills: 0 | Status: SHIPPED | OUTPATIENT
Start: 2023-05-04

## 2023-05-04 RX ORDER — HYDROXYZINE 50 MG/1
50 TABLET, FILM COATED ORAL 3 TIMES DAILY PRN
Qty: 30 TABLET | Refills: 0 | Status: SHIPPED | OUTPATIENT
Start: 2023-05-04 | End: 2023-05-04 | Stop reason: SDUPTHER

## 2023-05-04 RX ORDER — ONDANSETRON 4 MG/1
4 TABLET, FILM COATED ORAL EVERY 6 HOURS PRN
Qty: 40 TABLET | Refills: 0 | Status: SHIPPED | OUTPATIENT
Start: 2023-05-04 | End: 2023-05-04 | Stop reason: SDUPTHER

## 2023-05-04 RX ADMIN — FAMOTIDINE 40 MG: 20 TABLET ORAL at 07:57

## 2023-05-04 RX ADMIN — Medication 10 ML: at 08:00

## 2023-05-04 RX ADMIN — ONDANSETRON 4 MG: 2 INJECTION INTRAMUSCULAR; INTRAVENOUS at 06:02

## 2023-05-04 RX ADMIN — METOCLOPRAMIDE HYDROCHLORIDE 10 MG: 5 INJECTION INTRAMUSCULAR; INTRAVENOUS at 07:59

## 2023-05-04 RX ADMIN — BUSPIRONE HYDROCHLORIDE 5 MG: 5 TABLET ORAL at 07:57

## 2023-05-04 RX ADMIN — NICOTINE 1 PATCH: 21 PATCH, EXTENDED RELEASE TRANSDERMAL at 08:00

## 2023-05-04 RX ADMIN — LORAZEPAM 0.5 MG: 2 INJECTION INTRAMUSCULAR; INTRAVENOUS at 07:53

## 2023-05-04 NOTE — PLAN OF CARE
Goal Outcome Evaluation:              Outcome Evaluation: Patient very anxious and agitated this AM about wanting to leave. MD notified. PRN anxiety med given. Labs drawn before patient left and sent to lab. Patient refusing to stay for lab results. MD made aware. Patient discharged home. IV removed.

## 2023-05-04 NOTE — PLAN OF CARE
Goal Outcome Evaluation:  Plan of Care Reviewed With: patient        Progress: no change   Pt currently resting abed. No complaints or issues this nurses shift. VSS will cont to monitor.

## 2023-05-04 NOTE — PROGRESS NOTES
LOS: 1 day   Patient Care Team:  Radha Gudino APRN as PCP - General (Family Medicine)      Subjective     Interval History:     Subjective: Patient reports that he is feeling some better today.  No vomiting.  Denies abdominal pain.  States that he is anxious and wants to leave.      ROS:   No chest pain, shortness of breath, or cough.        Medication Review:     Current Facility-Administered Medications:   •  acetaminophen (TYLENOL) tablet 650 mg, 650 mg, Oral, Q4H PRN, Zeke Owens MD, 650 mg at 05/03/23 0743  •  amitriptyline (ELAVIL) tablet 50 mg, 50 mg, Oral, Nightly, Velma Melo APRN, 50 mg at 05/03/23 2115  •  busPIRone (BUSPAR) tablet 5 mg, 5 mg, Oral, Q12H, Zeke Owens MD, 5 mg at 05/04/23 0757  •  calcium carbonate (TUMS) chewable tablet 500 mg (200 mg elemental), 1 tablet, Oral, BID PRN, Zeke Owens MD  •  famotidine (PEPCID) tablet 40 mg, 40 mg, Oral, Daily, Zeke Owens MD, 40 mg at 05/04/23 0757  •  heparin (porcine) 5000 UNIT/ML injection 5,000 Units, 5,000 Units, Subcutaneous, Q12H, Zeke Owens MD  •  hydrOXYzine (ATARAX) tablet 50 mg, 50 mg, Oral, TID PRN, Zeke Owens MD  •  melatonin tablet 5 mg, 5 mg, Oral, Nightly PRN, Zeke Owens MD, 5 mg at 05/03/23 2115  •  metoclopramide (REGLAN) injection 10 mg, 10 mg, Intravenous, Q6H, Velma Melo APRN, 10 mg at 05/04/23 0759  •  nicotine (NICODERM CQ) 21 MG/24HR patch 1 patch, 1 patch, Transdermal, Q24H, Angélica Mendoza APRN, 1 patch at 05/04/23 0800  •  ondansetron (ZOFRAN) injection 4 mg, 4 mg, Intravenous, Q6H, Velma Melo APRN, 4 mg at 05/04/23 0602  •  ondansetron (ZOFRAN) tablet 4 mg, 4 mg, Oral, Q6H PRN, Zeke Owens MD  •  [COMPLETED] Insert Peripheral IV, , , Once **AND** sodium chloride 0.9 % flush 10 mL, 10 mL, Intravenous, PRN, Angélica Mendoza APRN  •  sodium chloride 0.9 % flush 10 mL, 10 mL, Intravenous, Q12H, Zeke Owens MD, 10 mL at 05/04/23 0800  •  sodium chloride 0.9 % flush 10  mL, 10 mL, Intravenous, PRN, Zeke Owens MD  •  sodium chloride 0.9 % infusion 40 mL, 40 mL, Intravenous, PRN, Zeke Owens MD  •  sodium chloride 0.9 % infusion, 125 mL/hr, Intravenous, Continuous, Velma Melo APRN, Last Rate: 125 mL/hr at 05/03/23 1719, 125 mL/hr at 05/03/23 1719    Current Outpatient Medications:   •  amitriptyline (ELAVIL) 50 MG tablet, Take 1 tablet by mouth Every Night., Disp: 30 tablet, Rfl: 0  •  busPIRone (BUSPAR) 5 MG tablet, Take 1 tablet by mouth Every 12 (Twelve) Hours., Disp: 30 tablet, Rfl: 0  •  hydrOXYzine (ATARAX) 50 MG tablet, Take 1 tablet by mouth 3 (Three) Times a Day As Needed for Anxiety., Disp: 30 tablet, Rfl: 0  •  ondansetron (ZOFRAN) 4 MG tablet, Take 1 tablet by mouth Every 6 (Six) Hours As Needed for Nausea or Vomiting., Disp: 40 tablet, Rfl: 0      Objective     Vital Signs  Vitals:    05/03/23 2015 05/04/23 0000 05/04/23 0325 05/04/23 0405   BP: 119/72 114/54 118/65    BP Location: Right arm Right arm Right arm    Patient Position: Lying Lying Lying    Pulse: 80 62     Resp: 16 18 18    Temp:  99 °F (37.2 °C) 98.9 °F (37.2 °C)    TempSrc:  Oral Oral    SpO2: 98% 96% 91%    Weight:    65.5 kg (144 lb 6.4 oz)   Height:           Physical Exam:     General Appearance:    Awake and alert, in no acute distress, anxious-appearing   Head:    Normocephalic, without obvious abnormality   Eyes:          Conjunctivae normal, anicteric sclera   Throat:   No oral lesions, no thrush, oral mucosa moist   Neck:   No adenopathy, supple, no JVD   Lungs:     respirations regular, even and unlabored   Abdomen:     Soft, non-tender, no rebound or guarding, non-distended   Rectal:     Deferred   Extremities:   No edema, no cyanosis   Skin:   No bruising or rash, no jaundice        Results Review:    CBC    Results from last 7 days   Lab Units 05/03/23  0428 05/02/23  0812   WBC 10*3/mm3 13.50* 22.20*   HEMOGLOBIN g/dL 14.6 18.5*   PLATELETS 10*3/mm3 221 335     CMP   Results from  last 7 days   Lab Units 05/03/23  0428 05/02/23  0812   SODIUM mmol/L 139 134*   POTASSIUM mmol/L 4.1 3.8   CHLORIDE mmol/L 100 83*   CO2 mmol/L 29.0 24.0   BUN mg/dL 21* 57*   CREATININE mg/dL 0.88 3.29*   GLUCOSE mg/dL 83 177*   ALBUMIN g/dL 4.4 7.0*   BILIRUBIN mg/dL 0.6 0.8   ALK PHOS U/L 66 80   AST (SGOT) U/L 36 37   ALT (SGPT) U/L 15 19   LIPASE U/L  --  21     Cr Clearance Estimated Creatinine Clearance: 118.9 mL/min (by C-G formula based on SCr of 0.88 mg/dL).  Coag     HbA1C   Lab Results   Component Value Date    HGBA1C 5.2 12/03/2018     Blood Glucose No results found for: POCGLU  Infection   Results from last 7 days   Lab Units 05/02/23  1205 05/02/23  0916 05/02/23  0846   BLOODCX   --  No growth at 24 hours No growth at 2 days   URINECX  No growth  --   --      UA    Results from last 7 days   Lab Units 05/02/23  1205 05/02/23  1201   NITRITE UA   --  Negative   URINECX  No growth  --      Radiology(recent) No radiology results for the last day       Assessment & Plan     ASSESSMENT:  -Nausea/vomiting  -Epigastric pain  -Cannabis hyperemesis syndrome  -MITZI  -Leukocytosis  -History of substance abuse     PLAN:  Patient is a pleasant 23-year-old male with history of cannabis hyperemesis syndrome who presents with complaints of abdominal pain and nausea/vomiting x4 days.  Patient was found to be in acute renal failure with creatinine of 3.29 on admission.    Patient reports that he is feeling better today.  Denies any abdominal pain.  No vomiting.  Tolerating liquids without difficulty.  Reports that he is anxious and wants to leave.  Okay for discharge home from GI standpoint.  Would recommend continuing amitriptyline.  Recommend complete marijuana cessation.  He can follow-up as needed on an outpatient basis      Electronically signed by JAM Barrett, 05/04/23, 9:01 AM EDT.

## 2023-05-04 NOTE — OUTREACH NOTE
Prep Survey    Flowsheet Row Responses   [facility]   Is LACE score < 7 ? Yes   Eligibility [facility]   Date of Admission 05/02/23   Date of Discharge 05/04/23   Discharge Disposition Home or Self Care   Discharge diagnosis acute renal insufficiency   Does the patient have one of the following disease processes/diagnoses(primary or secondary)? Other   Does the patient have Home health ordered? No   Is there a DME ordered? No   Prep survey completed? Yes          TOMMY VELIZ - Registered Nurse

## 2023-05-04 NOTE — CASE MANAGEMENT/SOCIAL WORK
Case Management Discharge Note      Final Note: Home     Discharged prior to CM assessment.     Provided Post Acute Provider List?: N/A    Selected Continued Care - Discharged on 5/4/2023 Admission date: 5/2/2023 - Discharge disposition: Home or Self Care            Transportation Services  Private: Car    Final Discharge Disposition Code: 01 - home or self-care

## 2023-05-04 NOTE — DISCHARGE SUMMARY
[facility] Medicine Services  Discharge Summary    Date of Service: 2023  Patient Name: Trey Ndiaye  : 2000  MRN: [id number]    Date of Admission: 2023  Discharge Diagnosis: cannabinoid hyperemesis syn  Date of Discharge:  2023  Primary Care Physician: Radha Gudino APRN      Presenting Problem:   Persistent vomiting [R11.15]  Acute renal insufficiency [N28.9]  Marijuana use [F12.90]    Active and Resolved Hospital Problems:  Active Hospital Problems    Diagnosis POA   • **Acute renal insufficiency [N28.9] Yes      Resolved Hospital Problems   No resolved problems to display.         Hospital Course     Hospital Course:    GI note 2023  Patient is a pleasant 23-year-old male with history of cannabis hyperemesis syndrome who presents with complaints of abdominal pain and nausea/vomiting x4 days.  Patient was found to be in acute renal failure with creatinine of 3.29 on admission.     Patient reports that he is feeling better today.  Denies any abdominal pain.  No vomiting.  Tolerating liquids without difficulty.  Reports that he is anxious and wants to leave.  Okay for discharge home from GI standpoint.  Would recommend continuing amitriptyline.  Recommend complete marijuana cessation.  He can follow-up as needed on an outpatient basis    Hospital medicine note 5/3/2023  Patient is a pleasant 23-year-old male with history of cannabis hyperemesis syndrome who presents with complaints of abdominal pain and nausea/vomiting x4 days.  Patient was found to be in acute renal failure with creatinine of 3.29 on admission.     Patient reports that he is feeling better today.  Denies any abdominal pain.  No vomiting.  Tolerating liquids without difficulty.  Reports that he is anxious and wants to leave.  Okay for discharge home from GI standpoint.  Would recommend continuing amitriptyline.  Recommend complete marijuana cessation.  He can follow-up as needed on an  outpatient basis.    Patient wants to go home badly this morning.  He states that he is feeling better.  Denies any vomiting or abdominal pain.  Tolerating liquid diet well and would like to advance diet.  He threatens to leave AMA if we will not let him go today.  However, labs showing improvement and he seems medically stable so he was discharged home.        Reasons For Change In Medications and Indications for New Medications:      Day of Discharge     Vital Signs:  Temp:  [98.1 °F (36.7 °C)-99 °F (37.2 °C)] 98.9 °F (37.2 °C)  Heart Rate:  [62-80] 62  Resp:  [12-18] 18  BP: (114-132)/(54-72) 118/65    Physical Exam:              Vitals and nursing note reviewed.   Constitutional:       General: He is not in acute distress.  HENT:      Head: Normocephalic and atraumatic.      Mouth/Throat:     Cardiovascular:         Heart sounds: No murmur heard.  Pulmonary:      Effort: Pulmonary effort is normal.      Breath sounds: Normal breath sounds.   Abdominal:      General: There is no distension or tenderness     Palpations: Abdomen is soft.        Musculoskeletal:         General: Normal range of motion.      Cervical back: Normal range of motion.   Neurological:      Mental Status: He is alert and oriented to person, place, and time.         Pertinent  and/or Most Recent Results     LAB RESULTS:      Lab 05/03/23 0428 05/02/23  1212 05/02/23  0849 05/02/23  0812   WBC 13.50*  --   --  22.20*   HEMOGLOBIN 14.6  --   --  18.5*   HEMATOCRIT 43.4  --   --  54.0*   PLATELETS 221  --   --  335   NEUTROS ABS 9.30*  --   --  18.70*   LYMPHS ABS 2.50  --   --  1.40   MONOS ABS 1.60*  --   --  2.00*   EOS ABS 0.10  --   --  0.10   MCV 90.0  --   --  87.7   LACTATE 1.3 1.2 2.7*  --          Lab 05/03/23  0428 05/02/23  0812   SODIUM 139 134*   POTASSIUM 4.1 3.8   CHLORIDE 100 83*   CO2 29.0 24.0   ANION GAP 10.0 27.0*   BUN 21* 57*   CREATININE 0.88 3.29*   EGFR 122.4 25.7*   GLUCOSE 83 177*   CALCIUM 9.4 11.0*   TSH 0.322  --           Lab 05/03/23  0428 05/02/23  0812   TOTAL PROTEIN 6.8 9.1*   ALBUMIN 4.4 7.0*   GLOBULIN 2.4 2.1   ALT (SGPT) 15 19   AST (SGOT) 36 37   BILIRUBIN 0.6 0.8   ALK PHOS 66 80   LIPASE  --  21                     Brief Urine Lab Results  (Last result in the past 365 days)      Color   Clarity   Blood   Leuk Est   Nitrite   Protein   CREAT   Urine HCG        05/02/23 1201 Yellow   Clear   Negative   Negative   Negative   Trace               Microbiology Results (last 10 days)     Procedure Component Value - Date/Time    Urine Culture - Urine, Urine, Clean Catch [[id number]]  (Normal) Collected: 05/02/23 1205    Lab Status: Final result Specimen: Urine, Clean Catch Updated: 05/03/23 1916     Urine Culture No growth    Blood Culture - Blood, Arm, Left [[id number]]  (Normal) Collected: 05/02/23 0916    Lab Status: Preliminary result Specimen: Blood from Arm, Left Updated: 05/03/23 0930     Blood Culture No growth at 24 hours    Blood Culture - Blood, Arm, Right [[id number]]  (Normal) Collected: 05/02/23 0846    Lab Status: Preliminary result Specimen: Blood from Arm, Right Updated: 05/03/23 0901     Blood Culture No growth at 24 hours          CT Abdomen Pelvis Without Contrast    Result Date: 5/2/2023  Impression: Impression: No acute intra-abdominal pathology. Electronically Signed: Sekou Greenfield  5/2/2023 9:13 AM EDT  Workstation ID: [id number]                  Labs Pending at Discharge:  Pending Labs     Order Current Status    Basic Metabolic Panel Collected (05/04/23 0824)    CBC & Differential Collected (05/04/23 0824)    CBC Auto Differential Collected (05/04/23 0824)    Blood Culture - Blood, Arm, Left Preliminary result    Blood Culture - Blood, Arm, Right Preliminary result          Procedures Performed           Consults:   Consults     Date and Time Order Name Status Description    5/2/2023  1:52 PM Inpatient Gastroenterology Consult Completed     5/2/2023 10:32 AM Hospitalist (on-call MD unless specified)               Discharge Details        Discharge Medications      New Medications      Instructions Start Date   amitriptyline 50 MG tablet  Commonly known as: ELAVIL   50 mg, Oral, Nightly      busPIRone 5 MG tablet  Commonly known as: BUSPAR   5 mg, Oral, Every 12 Hours Scheduled      hydrOXYzine 50 MG tablet  Commonly known as: ATARAX   50 mg, Oral, 3 Times Daily PRN      ondansetron 4 MG tablet  Commonly known as: ZOFRAN   4 mg, Oral, Every 6 Hours PRN             No Known Allergies      Discharge Disposition:  Home or Self Care    Diet:  Hospital:  Diet Order   Procedures   • Diet: Liquid Diets; Clear Liquid; Texture: Regular Texture (IDDSI 7); Fluid Consistency: Thin (IDDSI 0)         Discharge Activity:         CODE STATUS:  Code Status and Medical Interventions:   Ordered at: 05/02/23 1206     Level Of Support Discussed With:    Patient     Code Status (Patient has no pulse and is not breathing):    CPR (Attempt to Resuscitate)     Medical Interventions (Patient has pulse or is breathing):    Full Support         No future appointments.        Time spent on Discharge including face to face service:  42 minutes        Signature: Electronically signed by Foreign Owens MD, 05/04/23, 08:26 EDT.  [facility] Hospitalist Team

## 2023-05-05 ENCOUNTER — TRANSITIONAL CARE MANAGEMENT TELEPHONE ENCOUNTER (OUTPATIENT)
Dept: CALL CENTER | Facility: HOSPITAL | Age: 23
End: 2023-05-05
Payer: COMMERCIAL

## 2023-05-05 NOTE — OUTREACH NOTE
Call Center TCM Note    Flowsheet Row Responses   [facility] patient discharged from? [facility]   Does the patient have one of the following disease processes/diagnoses(primary or secondary)? Other   TCM attempt successful? Yes   Call start time 1514   Call end time 1515   Discharge diagnosis acute renal insufficiency   Does the patient have all medications ordered at discharge? Yes   Is the patient taking all medications as directed (includes completed medication regime)? Yes   Does the patient have an appointment with their PCP within 7 days of discharge? No   Nursing Interventions Patient declined scheduling/rescheduling appointment at this time   Has home health visited the patient within 72 hours of discharge? N/A   Psychosocial issues? No   Did the patient receive a copy of their discharge instructions? Yes   Nursing interventions Reviewed instructions with patient   What is the patient's perception of their health status since discharge? Improving   Is the patient/caregiver able to teach back signs and symptoms related to disease process for when to call PCP? Yes   Is the patient/caregiver able to teach back signs and symptoms related to disease process for when to call 911? Yes   Is the patient/caregiver able to teach back the hierarchy of who to call/visit for symptoms/problems? PCP, Specialist, Home health nurse, Urgent Care, ED, 911 Yes   TCM call completed? Yes   Wrap up additional comments Doing well, no questions, he will call the office to make a PCP appt if he decides to follow up.   Call end time 1515   Would this patient benefit from a Referral to Amb Social Work? No   Is the patient interested in additional calls from an ambulatory ?  NOTE:  applies to high risk patients requiring additional follow-up. No          Ana Henson RN    5/5/2023, 15:15 EDT

## 2023-05-07 LAB
BACTERIA SPEC AEROBE CULT: NORMAL
BACTERIA SPEC AEROBE CULT: NORMAL

## 2023-05-24 ENCOUNTER — OFFICE VISIT (OUTPATIENT)
Dept: FAMILY MEDICINE CLINIC | Facility: CLINIC | Age: 23
End: 2023-05-24
Payer: COMMERCIAL

## 2023-05-24 ENCOUNTER — LAB (OUTPATIENT)
Dept: FAMILY MEDICINE CLINIC | Facility: CLINIC | Age: 23
End: 2023-05-24
Payer: COMMERCIAL

## 2023-05-24 VITALS
RESPIRATION RATE: 18 BRPM | SYSTOLIC BLOOD PRESSURE: 124 MMHG | HEART RATE: 118 BPM | WEIGHT: 136 LBS | BODY MASS INDEX: 18.97 KG/M2 | DIASTOLIC BLOOD PRESSURE: 80 MMHG | OXYGEN SATURATION: 99 %

## 2023-05-24 DIAGNOSIS — F41.1 GENERALIZED ANXIETY DISORDER: Primary | ICD-10-CM

## 2023-05-24 DIAGNOSIS — F41.1 GENERALIZED ANXIETY DISORDER: ICD-10-CM

## 2023-05-24 DIAGNOSIS — R19.7 DIARRHEA, UNSPECIFIED TYPE: ICD-10-CM

## 2023-05-24 PROCEDURE — 99214 OFFICE O/P EST MOD 30 MIN: CPT | Performed by: NURSE PRACTITIONER

## 2023-05-24 RX ORDER — HYDROXYZINE 50 MG/1
50 TABLET, FILM COATED ORAL EVERY 8 HOURS PRN
Qty: 30 TABLET | Refills: 3 | Status: SHIPPED | OUTPATIENT
Start: 2023-05-24

## 2023-05-24 RX ORDER — AMITRIPTYLINE HYDROCHLORIDE 50 MG/1
50 TABLET, FILM COATED ORAL NIGHTLY
Qty: 30 TABLET | Refills: 3 | Status: SHIPPED | OUTPATIENT
Start: 2023-05-24

## 2023-05-24 NOTE — PROGRESS NOTES
Chief Complaint  Med Refill, Follow-up (Went to ER nausea,vomitting,anxiety), Anxiety, and Nausea    Subjective          Trey Ndiaye presents to [facility]  History of Present Illness    Is here today needing medication refill - was given a 15 minute appointment, he arrived late to his appintment  Last visit was in June of 2021    Current meds are noted to be amitriptyline 50mg, buspirone 5mg, hydroxyzine 50mg, and zofran 4mg - all prescribed per Dr. Zeke Owens (hospitalist)    Was recently admitted to the hospital with acute renal insufficiency  Has h/o cannabis hyperemesis syndrome, presented to the ER with n/v x 4 days, found to be in acute renal failure with creatinine of 3.29  He was discharged home at his request and felt to be stable at that time  The hospital MD recommended continuation of amitriptyline and recommended cessation of all marijuana use      He tells me that he has used all of the zofran  - he was subsequently ill again with n/v/d after his hospitalization  He also tells me that he has been having diarrhea for about 1 month    He endorses that his anxiety is improved, however he is out of the hydroxyzine and the buspar    He uses the marijuana to relieve the anxiety, he has continued to use marijuana, endorses that he uses it several times daily    He tells me that he cannot really tell if the buspar is helping his anxiety or not, but he definitely notices a difference with the hydroxyzine    Will try holding buspar with plan for close follow up    He tells me that he is trying to improve his lifestyle as he is a new father, has a 6 month of daughter    Encouraged cessation of marijuana use, increase po intake/calories to include nutritious food and protein, drink enough fluids for hydration, especially with GI complaints    Review of Systems   Constitutional: Positive for fatigue. Negative for appetite change and fever.        Fatigue is related to his  decreased oral intake with his recurrent nausea and persistent diarrhea   Respiratory: Negative.    Cardiovascular: Negative.    Gastrointestinal: Positive for diarrhea, nausea and vomiting. Negative for abdominal pain.   Genitourinary: Negative.    Musculoskeletal: Negative.    Neurological: Negative.    Psychiatric/Behavioral: The patient is nervous/anxious.      Objective   Vital Signs:  /80 (BP Location: Left arm, Patient Position: Sitting)   Pulse 118   Resp 18   Wt 61.7 kg (136 lb)   SpO2 99%   BMI 18.97 kg/m²     BP Readings from Last 3 Encounters:   05/24/23 124/80   05/04/23 118/65   12/13/21 129/74        Wt Readings from Last 3 Encounters:   05/24/23 61.7 kg (136 lb)   05/04/23 65.5 kg (144 lb 6.4 oz)   12/13/21 62.6 kg (138 lb)        BMI is within normal parameters. No other follow-up for BMI required.      Physical Exam  Vitals reviewed.   Constitutional:       Appearance: Normal appearance.   Neck:      Thyroid: No thyromegaly.      Vascular: No carotid bruit.   Cardiovascular:      Rate and Rhythm: Normal rate and regular rhythm.      Pulses: Normal pulses.      Heart sounds: Normal heart sounds.   Pulmonary:      Effort: Pulmonary effort is normal.      Breath sounds: Normal breath sounds.   Abdominal:      General: Bowel sounds are normal. There is no distension.      Palpations: Abdomen is soft.      Tenderness: There is no abdominal tenderness. There is no guarding.   Musculoskeletal:      Cervical back: Neck supple. No tenderness.      Right lower leg: No edema.      Left lower leg: No edema.   Lymphadenopathy:      Cervical: No cervical adenopathy.   Skin:     General: Skin is warm.   Neurological:      Mental Status: He is alert and oriented to person, place, and time.   Psychiatric:         Mood and Affect: Mood normal.        Result Review :     CMP        5/2/2023    08:12 5/3/2023    04:28 5/4/2023    08:24   CMP   Glucose 177   83   131     BUN 57   21   13     Creatinine 3.29    0.88   0.70     EGFR 25.7   122.4   131.1     Sodium 134   139   140     Potassium 3.8   4.1   4.0     Chloride 83   100   107     Calcium 11.0   9.4   9.4     Total Protein 9.1   6.8      Albumin 7.0   4.4      Globulin 2.1   2.4      Total Bilirubin 0.8   0.6      Alkaline Phosphatase 80   66      AST (SGOT) 37   36      ALT (SGPT) 19   15      Albumin/Globulin Ratio 3.3   1.8      BUN/Creatinine Ratio 17.3   23.9   18.6     Anion Gap 27.0   10.0   11.0       CBC        5/2/2023    08:12 5/3/2023    04:28 5/4/2023    08:24   CBC   WBC 22.20   13.50   11.50     RBC 6.16   4.82   4.36     Hemoglobin 18.5   14.6   13.2     Hematocrit 54.0   43.4   39.3     MCV 87.7   90.0   90.1     MCH 30.0   30.3   30.3     MCHC 34.2   33.6   33.6     RDW 13.3   13.2   13.2     Platelets 335   221   183       TSH        5/3/2023    04:28   TSH   TSH 0.322       BMP        5/2/2023    08:12 5/3/2023    04:28 5/4/2023    08:24   BMP   BUN 57   21   13     Creatinine 3.29   0.88   0.70     Sodium 134   139   140     Potassium 3.8   4.1   4.0     Chloride 83   100   107     CO2 24.0   29.0   22.0     Calcium 11.0   9.4   9.4       UA        5/2/2023    12:01   Urinalysis   Specific [place], UA 1.019     Ketones, UA Trace     Blood, UA Negative     Leukocytes, UA Negative     Nitrite, UA Negative       Urine Culture        5/2/2023    12:05   Urine Culture   Urine Culture No growth                 Assessment and Plan    Diagnoses and all orders for this visit:    1. Generalized anxiety disorder (Primary)  -     TSH Rfx On Abnormal To Free T4; Future  -     amitriptyline (ELAVIL) 50 MG tablet; Take 1 tablet by mouth Every Night.  Dispense: 30 tablet; Refill: 3  -     hydrOXYzine (ATARAX) 50 MG tablet; Take 1 tablet by mouth Every 8 (Eight) Hours As Needed for Anxiety.  Dispense: 30 tablet; Refill: 3    2. Diarrhea, unspecified type  -     Ambulatory Referral to Gastroenterology  -     Comprehensive metabolic panel; Future  -     CBC No  Differential; Future           Follow Up   Return in about 2 weeks (around 6/7/2023) for Recheck moo, review labs.  Patient was given instructions and counseling regarding his condition or for health maintenance advice. Please see specific information pulled into the AVS if appropriate.

## 2023-08-30 DIAGNOSIS — F41.1 GENERALIZED ANXIETY DISORDER: ICD-10-CM

## 2023-08-31 RX ORDER — HYDROXYZINE 50 MG/1
TABLET, FILM COATED ORAL
Qty: 30 TABLET | Refills: 3 | Status: SHIPPED | OUTPATIENT
Start: 2023-08-31

## 2023-09-29 DIAGNOSIS — F41.1 GENERALIZED ANXIETY DISORDER: ICD-10-CM

## 2023-10-01 RX ORDER — AMITRIPTYLINE HYDROCHLORIDE 50 MG/1
50 TABLET, FILM COATED ORAL NIGHTLY
Qty: 30 TABLET | Refills: 3 | Status: SHIPPED | OUTPATIENT
Start: 2023-10-01

## 2024-01-09 DIAGNOSIS — F41.1 GENERALIZED ANXIETY DISORDER: ICD-10-CM

## 2024-01-09 RX ORDER — AMITRIPTYLINE HYDROCHLORIDE 50 MG/1
50 TABLET, FILM COATED ORAL NIGHTLY
Qty: 30 TABLET | Refills: 3 | Status: SHIPPED | OUTPATIENT
Start: 2024-01-09

## 2024-04-28 DIAGNOSIS — F41.1 GENERALIZED ANXIETY DISORDER: ICD-10-CM

## 2024-04-29 RX ORDER — HYDROXYZINE 50 MG/1
TABLET, FILM COATED ORAL
Qty: 30 TABLET | Refills: 3 | Status: SHIPPED | OUTPATIENT
Start: 2024-04-29

## 2024-05-28 DIAGNOSIS — F41.1 GENERALIZED ANXIETY DISORDER: ICD-10-CM

## 2024-05-28 RX ORDER — AMITRIPTYLINE HYDROCHLORIDE 50 MG/1
50 TABLET, FILM COATED ORAL NIGHTLY
Qty: 30 TABLET | Refills: 3 | Status: SHIPPED | OUTPATIENT
Start: 2024-05-28

## 2024-09-13 DIAGNOSIS — F41.1 GENERALIZED ANXIETY DISORDER: ICD-10-CM

## 2024-09-13 RX ORDER — AMITRIPTYLINE HYDROCHLORIDE 50 MG/1
50 TABLET ORAL NIGHTLY
Qty: 30 TABLET | Refills: 3 | Status: SHIPPED | OUTPATIENT
Start: 2024-09-13

## 2024-09-13 RX ORDER — HYDROXYZINE HYDROCHLORIDE 50 MG/1
TABLET, FILM COATED ORAL
Qty: 30 TABLET | Refills: 3 | Status: SHIPPED | OUTPATIENT
Start: 2024-09-13

## 2025-01-21 DIAGNOSIS — F41.1 GENERALIZED ANXIETY DISORDER: ICD-10-CM

## 2025-01-21 RX ORDER — HYDROXYZINE HYDROCHLORIDE 50 MG/1
TABLET, FILM COATED ORAL
Qty: 30 TABLET | Refills: 3 | Status: SHIPPED | OUTPATIENT
Start: 2025-01-21

## 2025-01-21 RX ORDER — AMITRIPTYLINE HYDROCHLORIDE 50 MG/1
50 TABLET ORAL NIGHTLY
Qty: 30 TABLET | Refills: 3 | Status: SHIPPED | OUTPATIENT
Start: 2025-01-21

## 2025-04-14 DIAGNOSIS — F41.1 GENERALIZED ANXIETY DISORDER: ICD-10-CM

## 2025-04-14 NOTE — TELEPHONE ENCOUNTER
Caller: Trey Ndiaye    Relationship: Self    Best call back number: [phone]     Requested Prescriptions:   Requested Prescriptions     Pending Prescriptions Disp Refills    amitriptyline (ELAVIL) 50 MG tablet 30 tablet 3     Sig: Take 1 tablet by mouth Every Night.        Pharmacy where request should be sent: [id number] - [place], [place] - [address] AT [facility] [address] [facility] [address] [facility] [facility] [phone] [facility] [phone]      Last office visit with prescribing clinician: 5/24/2023   Last telemedicine visit with prescribing clinician: Visit date not found   Next office visit with prescribing clinician: Visit date not found     Does the patient have less than a 3 day supply:  [x] Yes  [] No    Would you like a call back once the refill request has been completed: [] Yes [x] No    If the office needs to give you a call back, can they leave a voicemail: [] Yes [x] No    Abril Erazo Rep   04/14/25 13:13 EDT

## 2025-04-16 NOTE — TELEPHONE ENCOUNTER
Caller: Trey Ndiaye    Relationship: Self    Best call back number:     [phone] (Mobile)       What was the call regarding: PATIENT IS ALMOST OUT OF MEDICATION AND WANTING AN UPDATE ON REFILL REQUEST PLEASE     Is it okay if the provider responds through MyChart: CALL

## 2025-04-17 RX ORDER — AMITRIPTYLINE HYDROCHLORIDE 50 MG/1
50 TABLET ORAL NIGHTLY
Qty: 30 TABLET | Refills: 0 | Status: SHIPPED | OUTPATIENT
Start: 2025-04-17

## 2025-04-17 NOTE — TELEPHONE ENCOUNTER
Patient is asking for med refills and has not been seen since 5/2023, please schedule if able.  I have refilled the requested prescription (amitriptyline) for 30 days, he will have to be seen for continued refills.  Thank you

## 2025-04-17 NOTE — TELEPHONE ENCOUNTER
HUB TO RELAY: PHONE NUMBER ON FILE NOT IN SERVICE. PATIENT HAS NOT BEEN SEEN IN 2 YEARS AND MUST MAKE AN APPOINTMENT FOR A PHYSICAL BEFORE GETTING ANY MEDICATION.

## 2025-06-10 ENCOUNTER — DOCUMENTATION (OUTPATIENT)
Dept: FAMILY MEDICINE CLINIC | Facility: CLINIC | Age: 25
End: 2025-06-10
Payer: COMMERCIAL

## 2025-06-10 DIAGNOSIS — F41.1 GENERALIZED ANXIETY DISORDER: ICD-10-CM

## 2025-06-10 RX ORDER — AMITRIPTYLINE HYDROCHLORIDE 50 MG/1
50 TABLET ORAL NIGHTLY
Qty: 30 TABLET | Refills: 0 | Status: SHIPPED | OUTPATIENT
Start: 2025-06-10

## 2025-06-10 NOTE — PROGRESS NOTES
Patient called on call service requesting refill of medication and curious why he has to call to get refills every month or so. Looking at his chart, amitriptyline was already refilled today and he has not had appointment in over 2 years. Tried to call to let him know but no answer.

## 2025-07-22 DIAGNOSIS — F41.1 GENERALIZED ANXIETY DISORDER: ICD-10-CM

## 2025-07-22 RX ORDER — AMITRIPTYLINE HYDROCHLORIDE 50 MG/1
50 TABLET ORAL NIGHTLY
Qty: 30 TABLET | Refills: 0 | Status: SHIPPED | OUTPATIENT
Start: 2025-07-22

## 2025-08-21 DIAGNOSIS — F41.1 GENERALIZED ANXIETY DISORDER: ICD-10-CM

## 2025-08-21 RX ORDER — AMITRIPTYLINE HYDROCHLORIDE 50 MG/1
50 TABLET ORAL NIGHTLY
Qty: 30 TABLET | Refills: 0 | Status: SHIPPED | OUTPATIENT
Start: 2025-08-21